# Patient Record
Sex: MALE | URBAN - METROPOLITAN AREA
[De-identification: names, ages, dates, MRNs, and addresses within clinical notes are randomized per-mention and may not be internally consistent; named-entity substitution may affect disease eponyms.]

---

## 2021-12-15 ENCOUNTER — OFFICE (OUTPATIENT)
Dept: URBAN - METROPOLITAN AREA PATHOLOGY 4 | Facility: PATHOLOGY | Age: 66
End: 2021-12-15
Payer: OTHER GOVERNMENT

## 2021-12-15 ENCOUNTER — AMBULATORY SURGICAL CENTER (OUTPATIENT)
Dept: URBAN - METROPOLITAN AREA SURGERY 10 | Facility: SURGERY | Age: 66
End: 2021-12-15
Payer: OTHER GOVERNMENT

## 2021-12-15 DIAGNOSIS — K22.4 DYSKINESIA OF ESOPHAGUS: ICD-10-CM

## 2021-12-15 DIAGNOSIS — K29.50 UNSPECIFIED CHRONIC GASTRITIS WITHOUT BLEEDING: ICD-10-CM

## 2021-12-15 DIAGNOSIS — K44.9 DIAPHRAGMATIC HERNIA WITHOUT OBSTRUCTION OR GANGRENE: ICD-10-CM

## 2021-12-15 DIAGNOSIS — K21.9 GASTRO-ESOPHAGEAL REFLUX DISEASE WITHOUT ESOPHAGITIS: ICD-10-CM

## 2021-12-15 DIAGNOSIS — K22.89 OTHER SPECIFIED DISEASE OF ESOPHAGUS: ICD-10-CM

## 2021-12-15 DIAGNOSIS — K31.89 OTHER DISEASES OF STOMACH AND DUODENUM: ICD-10-CM

## 2021-12-15 PROCEDURE — 88305 TISSUE EXAM BY PATHOLOGIST: CPT | Performed by: INTERNAL MEDICINE

## 2021-12-15 PROCEDURE — 43239 EGD BIOPSY SINGLE/MULTIPLE: CPT | Performed by: INTERNAL MEDICINE

## 2021-12-16 PROBLEM — K21.9 ACID REFLUX: Status: ACTIVE | Noted: 2021-12-16

## 2022-12-21 ENCOUNTER — TELEPHONE (OUTPATIENT)
Dept: CARDIOLOGY | Facility: CLINIC | Age: 67
End: 2022-12-21

## 2022-12-21 NOTE — TELEPHONE ENCOUNTER
Dr Dial spoke with Dr. Lutz via phone regarding TAVR workup for this pt.     I spoke with pt this morning. He states that he was worked up at  for his valve last year but things were not as bad as they looked on the original echo. I have obtained the physical copy of records from  and have requested the images from  and the echo images from Dr. Dial's office. PT currently has COVID so he can do any testing/procedures right now anyway. His father is actively passing in [place] so he is trying to get there as well to see him. Pt denies any other symptoms at this time- no shortness of breath, no fatigue- just covid symptoms. Will f/u with pt after Dr. Lutz has a chance to review all the records     Notified ANN MARIE Hernandez as well

## 2022-12-29 ENCOUNTER — APPOINTMENT (OUTPATIENT)
Dept: PREADMISSION TESTING | Facility: HOSPITAL | Age: 67
End: 2022-12-29

## 2023-01-04 ENCOUNTER — HOSPITAL ENCOUNTER (OUTPATIENT)
Dept: CARDIOLOGY | Facility: HOSPITAL | Age: 68
Discharge: HOME | End: 2023-01-04
Payer: MEDICARE

## 2023-01-04 DIAGNOSIS — Z00.6 ENCOUNTER FOR EXAMINATION FOR NORMAL COMPARISON OR CONTROL IN CLINICAL RESEARCH PROGRAM: ICD-10-CM

## 2023-01-16 ENCOUNTER — TELEPHONE (OUTPATIENT)
Dept: CARDIOLOGY | Facility: CLINIC | Age: 68
End: 2023-01-16
Payer: MEDICARE

## 2023-01-16 DIAGNOSIS — I35.0 AORTIC STENOSIS, SEVERE: Primary | ICD-10-CM

## 2023-01-16 NOTE — TELEPHONE ENCOUNTER
Spoke with pt- Dr. Lutz reviewed echo images- he will need FLOR- ok to do consult and treat FLOR if pt wishes to proceed. He has a planned trip to [place] next month and wants to schedule around that trip. Pt still denies symptoms to me.     Per Dr. Lutz, will need to obtain [facility] images from 2021 done at  - those were requested via fax 1/16/2023.     Pt agreeable to consult and treat FLOR

## 2023-01-19 ENCOUNTER — HOSPITAL ENCOUNTER (OUTPATIENT)
Dept: CARDIOLOGY | Facility: HOSPITAL | Age: 68
Discharge: HOME | End: 2023-01-19

## 2023-01-19 DIAGNOSIS — Z00.6 EXAMINATION FOR NORMAL COMPARISON OR CONTROL IN CLINICAL RESEARCH: ICD-10-CM

## 2023-01-28 PROBLEM — I35.0 AORTIC STENOSIS, SEVERE: Status: ACTIVE | Noted: 2023-01-28

## 2023-01-28 PROBLEM — I25.10 CORONARY ARTERY DISEASE INVOLVING NATIVE CORONARY ARTERY OF NATIVE HEART: Status: ACTIVE | Noted: 2023-01-28

## 2023-02-01 ENCOUNTER — OFFICE VISIT (OUTPATIENT)
Dept: CARDIOLOGY | Facility: CLINIC | Age: 68
End: 2023-02-01
Payer: MEDICARE

## 2023-02-01 VITALS
DIASTOLIC BLOOD PRESSURE: 64 MMHG | OXYGEN SATURATION: 98 % | SYSTOLIC BLOOD PRESSURE: 130 MMHG | HEART RATE: 102 BPM | WEIGHT: 181 LBS | BODY MASS INDEX: 26.81 KG/M2 | HEIGHT: 69 IN | RESPIRATION RATE: 20 BRPM

## 2023-02-01 DIAGNOSIS — I49.3 PVC (PREMATURE VENTRICULAR CONTRACTION): ICD-10-CM

## 2023-02-01 DIAGNOSIS — I25.10 CORONARY ARTERY DISEASE INVOLVING NATIVE CORONARY ARTERY OF NATIVE HEART WITHOUT ANGINA PECTORIS: ICD-10-CM

## 2023-02-01 DIAGNOSIS — I35.0 AORTIC STENOSIS, SEVERE: Primary | ICD-10-CM

## 2023-02-01 DIAGNOSIS — E78.2 MIXED HYPERLIPIDEMIA: ICD-10-CM

## 2023-02-01 PROCEDURE — 93000 ELECTROCARDIOGRAM COMPLETE: CPT | Performed by: NURSE PRACTITIONER

## 2023-02-01 PROCEDURE — 99203 OFFICE O/P NEW LOW 30 MIN: CPT | Performed by: INTERNAL MEDICINE

## 2023-02-01 RX ORDER — LORATADINE 10 MG/1
10 TABLET ORAL DAILY PRN
COMMUNITY

## 2023-02-01 RX ORDER — TRIAMCINOLONE ACETONIDE 1 MG/G
1 CREAM TOPICAL 2 TIMES DAILY PRN
COMMUNITY

## 2023-02-01 RX ORDER — ATORVASTATIN CALCIUM 20 MG/1
20 TABLET, FILM COATED ORAL DAILY
COMMUNITY

## 2023-02-01 RX ORDER — LOSARTAN POTASSIUM 50 MG/1
50 TABLET ORAL DAILY
COMMUNITY

## 2023-02-01 RX ORDER — VIT C/B6/B5/MAGNESIUM/HERB 173 50-5-6-5MG
1000 CAPSULE ORAL DAILY
COMMUNITY

## 2023-02-02 DIAGNOSIS — I35.0 AORTIC STENOSIS, SEVERE: Primary | ICD-10-CM

## 2023-02-02 DIAGNOSIS — R09.89 OTHER SPECIFIED SYMPTOMS AND SIGNS INVOLVING THE CIRCULATORY AND RESPIRATORY SYSTEMS: ICD-10-CM

## 2023-02-02 NOTE — PROGRESS NOTES
"Referral received from Dr. Lutz due to severe AS. Contacted patient to discuss valve replacement options, pre-procedural testing requirements and need for CTS consult. He feels well informed after his discussion with Dr. Lutz. Order placed for FLOR. He originally planned to be in [place] from -, but his father may be transitioning to Hospice care in [place] and he is unsure of his travel plans. We will schedule FLOR, CTA and carotid duplex when he returns. Up to date on dental appointments, most recent 2023. He does not have a CTS MD preference. Educational materials reviewed and mailed to patient.    Will follow up week of     TAVR Pre-Op Checklist    Patient Name: Jesus Alberto Sue   67 y.o. [id number]  Residence: [place], [place]    Referral Date: 23 : 1955   Ht:69\" Wt: 181lb     Referring Cardiologist: Dr. Bruce/Dr. Lutz  BMI: 26.7    Initial TTE date:OUTSIDE NON-INVASIVE CARDIOLOGY STUDY (2023 10:14)    MASSIEL: 0.8cm2 AV mean: 52 AV Vmax: 72 LVEF: 50-55%    CT Surgery Consult: P     STS Score: P     Allergy (Contrast):Patient has no known allergies.   Pre-op meds (contrast): N     Anticoagulated: No Last dose:NA Heparin or Lovenox Bridge: NA    CTA Date: P     CTA 3D: P    Left Coronary Ht: P  Right Coronary Ht: P  Annulus Area: P    CTA Important findings: P    Cardiac cath:OUTSIDE INVASIVE CARDIOLOGY STUDY (2023 14:53)    FLOR: P        Physician: Dr. Lutz   FLOR annulus: P  Projected TAVR Prosthesis Size:     Carotid duplex: P      Dobutamine GXT:P      EKG rhythm: P   PPM/ Last download :NA    PFT (FEV1):P    Important meds: None    PAT lab review:  BNP:P BUN/Creatinine: P H&H:P  PLTS:P    P2Y12:P HGA1C: P CXR:P      TAVR Procedure Date: P          "

## 2023-02-13 ENCOUNTER — PREP FOR SURGERY (OUTPATIENT)
Dept: OTHER | Facility: HOSPITAL | Age: 68
End: 2023-02-13
Payer: MEDICARE

## 2023-02-23 ENCOUNTER — HOSPITAL ENCOUNTER (OUTPATIENT)
Dept: CARDIOLOGY | Facility: HOSPITAL | Age: 68
Discharge: HOME | End: 2023-02-23
Payer: MEDICARE

## 2023-02-23 VITALS — HEIGHT: 69 IN | BODY MASS INDEX: 26.81 KG/M2 | WEIGHT: 181 LBS

## 2023-02-23 VITALS
RESPIRATION RATE: 16 BRPM | SYSTOLIC BLOOD PRESSURE: 103 MMHG | HEART RATE: 74 BPM | OXYGEN SATURATION: 93 % | DIASTOLIC BLOOD PRESSURE: 60 MMHG

## 2023-02-23 DIAGNOSIS — R09.89 OTHER SPECIFIED SYMPTOMS AND SIGNS INVOLVING THE CIRCULATORY AND RESPIRATORY SYSTEMS: ICD-10-CM

## 2023-02-23 DIAGNOSIS — I35.0 AORTIC STENOSIS, SEVERE: ICD-10-CM

## 2023-02-23 LAB
ASCENDING AORTA: 4 CM
BH CV ECHO MEAS - LV MAX PG: 1.63 MMHG
BH CV ECHO MEAS - LV MEAN PG: 1 MMHG
BH CV ECHO MEAS - LV V1 MAX: 63.9 CM/SEC
BH CV ECHO MEAS - LV V1 VTI: 13.3 CM
BH CV ECHO MEAS - LVOT AREA: 7.1 CM2
BH CV ECHO MEAS - LVOT DIAM: 3 CM
BH CV ECHO MEAS - SV(LVOT): 94 ML
BH CV VAS BP LEFT ARM: NORMAL MMHG
BH CV XLRA MEAS LEFT DIST CCA EDV: 16.5 CM/SEC
BH CV XLRA MEAS LEFT DIST CCA PSV: 108 CM/SEC
BH CV XLRA MEAS LEFT DIST ICA EDV: 23.2 CM/SEC
BH CV XLRA MEAS LEFT DIST ICA PSV: 75.6 CM/SEC
BH CV XLRA MEAS LEFT ICA/CCA RATIO: 0.8
BH CV XLRA MEAS LEFT MID CCA EDV: 18.4 CM/SEC
BH CV XLRA MEAS LEFT MID CCA PSV: 142 CM/SEC
BH CV XLRA MEAS LEFT MID ICA EDV: 30.6 CM/SEC
BH CV XLRA MEAS LEFT MID ICA PSV: 114 CM/SEC
BH CV XLRA MEAS LEFT PROX CCA EDV: 8.5 CM/SEC
BH CV XLRA MEAS LEFT PROX CCA PSV: 110 CM/SEC
BH CV XLRA MEAS LEFT PROX ECA EDV: 23.8 CM/SEC
BH CV XLRA MEAS LEFT PROX ECA PSV: 91.8 CM/SEC
BH CV XLRA MEAS LEFT PROX ICA EDV: 27.4 CM/SEC
BH CV XLRA MEAS LEFT PROX ICA PSV: 76.8 CM/SEC
BH CV XLRA MEAS LEFT PROX SCLA PSV: 142 CM/SEC
BH CV XLRA MEAS LEFT VERTEBRAL A EDV: 16.1 CM/SEC
BH CV XLRA MEAS LEFT VERTEBRAL A PSV: 62.3 CM/SEC
BH CV XLRA MEAS RIGHT DIST CCA EDV: 13.9 CM/SEC
BH CV XLRA MEAS RIGHT DIST CCA PSV: 95.3 CM/SEC
BH CV XLRA MEAS RIGHT DIST ICA EDV: 21.1 CM/SEC
BH CV XLRA MEAS RIGHT DIST ICA PSV: 79.1 CM/SEC
BH CV XLRA MEAS RIGHT ICA/CCA RATIO: 0.76
BH CV XLRA MEAS RIGHT MID CCA EDV: 13.9 CM/SEC
BH CV XLRA MEAS RIGHT MID CCA PSV: 106 CM/SEC
BH CV XLRA MEAS RIGHT MID ICA EDV: 22.5 CM/SEC
BH CV XLRA MEAS RIGHT MID ICA PSV: 81.2 CM/SEC
BH CV XLRA MEAS RIGHT PROX CCA EDV: 14.7 CM/SEC
BH CV XLRA MEAS RIGHT PROX CCA PSV: 101 CM/SEC
BH CV XLRA MEAS RIGHT PROX ECA EDV: 13.2 CM/SEC
BH CV XLRA MEAS RIGHT PROX ECA PSV: 49.9 CM/SEC
BH CV XLRA MEAS RIGHT PROX ICA EDV: 19.8 CM/SEC
BH CV XLRA MEAS RIGHT PROX ICA PSV: 76.3 CM/SEC
BH CV XLRA MEAS RIGHT PROX SCLA PSV: 110 CM/SEC
BH CV XLRA MEAS RIGHT VERTEBRAL A EDV: 13.2 CM/SEC
BH CV XLRA MEAS RIGHT VERTEBRAL A PSV: 62.6 CM/SEC
LEFT ARM BP: NORMAL MMHG
LV EF 2D ECHO EST: 45 %
MAXIMAL PREDICTED HEART RATE: 153 BPM
MAXIMAL PREDICTED HEART RATE: 153 BPM
STRESS TARGET HR: 130 BPM
STRESS TARGET HR: 130 BPM

## 2023-02-23 PROCEDURE — 93312 ECHO TRANSESOPHAGEAL: CPT

## 2023-02-23 PROCEDURE — 93320 DOPPLER ECHO COMPLETE: CPT | Performed by: INTERNAL MEDICINE

## 2023-02-23 PROCEDURE — 93325 DOPPLER ECHO COLOR FLOW MAPG: CPT | Performed by: INTERNAL MEDICINE

## 2023-02-23 PROCEDURE — 25010000002 FENTANYL CITRATE (PF) 50 MCG/ML SOLUTION: Performed by: INTERNAL MEDICINE

## 2023-02-23 PROCEDURE — 93880 EXTRACRANIAL BILAT STUDY: CPT | Performed by: INTERNAL MEDICINE

## 2023-02-23 PROCEDURE — 93320 DOPPLER ECHO COMPLETE: CPT

## 2023-02-23 PROCEDURE — 93325 DOPPLER ECHO COLOR FLOW MAPG: CPT

## 2023-02-23 PROCEDURE — 25010000002 MIDAZOLAM PER 1 MG: Performed by: INTERNAL MEDICINE

## 2023-02-23 PROCEDURE — 93312 ECHO TRANSESOPHAGEAL: CPT | Performed by: INTERNAL MEDICINE

## 2023-02-23 PROCEDURE — 93880 EXTRACRANIAL BILAT STUDY: CPT

## 2023-02-23 PROCEDURE — 99152 MOD SED SAME PHYS/QHP 5/>YRS: CPT | Performed by: INTERNAL MEDICINE

## 2023-02-23 RX ORDER — FENTANYL CITRATE 50 UG/ML
INJECTION, SOLUTION INTRAMUSCULAR; INTRAVENOUS
Status: COMPLETED | OUTPATIENT
Start: 2023-02-23 | End: 2023-02-23

## 2023-02-23 RX ORDER — MIDAZOLAM HYDROCHLORIDE 1 MG/ML
INJECTION INTRAMUSCULAR; INTRAVENOUS
Status: COMPLETED | OUTPATIENT
Start: 2023-02-23 | End: 2023-02-23

## 2023-02-23 RX ADMIN — MIDAZOLAM HYDROCHLORIDE 2 MG: 1 INJECTION, SOLUTION INTRAMUSCULAR; INTRAVENOUS at 14:45

## 2023-02-23 RX ADMIN — FENTANYL CITRATE 50 MCG: 50 INJECTION, SOLUTION INTRAMUSCULAR; INTRAVENOUS at 14:50

## 2023-02-23 RX ADMIN — FENTANYL CITRATE 50 MCG: 50 INJECTION, SOLUTION INTRAMUSCULAR; INTRAVENOUS at 14:45

## 2023-02-23 RX ADMIN — MIDAZOLAM HYDROCHLORIDE 2 MG: 1 INJECTION, SOLUTION INTRAMUSCULAR; INTRAVENOUS at 14:50

## 2023-02-23 NOTE — INTERVAL H&P NOTE
Preprocedural Update  H&P updated. The patient was examined and the following changes are noted:  The patient has noted a cough for around a week. COVID19 testing negative two days ago.     Problem List:     1. Nonobstructive coronary artery disease  a. Cardiac catheterization, 6/17/2021: Nonobstructive CAD.  Mild aortic stenosis with mean gradient 18 mmHg  2. Nonrheumatic aortic stenosis  a. Mild nonobstructive aortic stenosis with mean gradient 18 mmHg noted on cath 6/2021  b. Echocardiogram, 12/19/2022; EF 50-55%. Mild concentric LVH. LA is mildly dilated. RV mildly enlarged. Severe aortic stenosis with peak/mean pressure gradient of 72.85 mmHg/52.10 mmHg, the AV area by continuity is 0.8 cm2. Mild AR. Mild MR. Diastolic function is abnormal with a septal E = 0.07 m/s. Mild TR. Mild PH. RVSP 42.63 mmHg. 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 There is an aneurysm of the ascending aorta measuring up to 3.9 cm.   3. PVC's  a. Noted on EKG 2/1/2023  4. KYLEIGH, intolerant to CPAP  5. GERD    • Patient denies previous complication from sedation. NKDA    Tele: Sinus rhythm with frequent PVCs    Vitals and Labs  There were no vitals filed for this visit.  Lab Results   Component Value Date    BUN 16 06/16/2021    CREATININE 0.96 06/16/2021    BCR 17 06/16/2021    K 4.5 06/16/2021    CO2 24 06/16/2021    CALCIUM 8.9 06/16/2021     Lab Results   Component Value Date    WBC 5.94 06/16/2021    HGB 12.0 (L) 06/16/2021    HCT 35.4 (L) 06/16/2021    MCV 92 06/16/2021     06/16/2021     No results found for: CHOL, CHLPL, TRIG, HDL, LDL, LDLDIRECT  No results found for: HGBA1C    The nature transesophageal echo was discussed with the patient as well as the indication, benefits, risks, and alternatives of the procedure.  Patient expresses understanding wish to proceed.    LILIA Bearden MD, FACC

## 2023-03-01 ENCOUNTER — HOSPITAL ENCOUNTER (OUTPATIENT)
Dept: CT IMAGING | Facility: HOSPITAL | Age: 68
Discharge: HOME | End: 2023-03-01
Admitting: INTERNAL MEDICINE
Payer: MEDICARE

## 2023-03-01 VITALS
SYSTOLIC BLOOD PRESSURE: 115 MMHG | WEIGHT: 181 LBS | DIASTOLIC BLOOD PRESSURE: 68 MMHG | BODY MASS INDEX: 26.81 KG/M2 | TEMPERATURE: 96.9 F | RESPIRATION RATE: 16 BRPM | OXYGEN SATURATION: 98 % | HEIGHT: 69 IN | HEART RATE: 85 BPM

## 2023-03-01 DIAGNOSIS — I35.0 AORTIC STENOSIS, SEVERE: ICD-10-CM

## 2023-03-01 PROCEDURE — 25510000001 IOPAMIDOL PER 1 ML: Performed by: INTERNAL MEDICINE

## 2023-03-01 PROCEDURE — A9270 NON-COVERED ITEM OR SERVICE: HCPCS | Performed by: RADIOLOGY

## 2023-03-01 PROCEDURE — 63710000001 METOPROLOL TARTRATE 50 MG TABLET: Performed by: RADIOLOGY

## 2023-03-01 PROCEDURE — 71275 CT ANGIOGRAPHY CHEST: CPT

## 2023-03-01 PROCEDURE — 74174 CTA ABD&PLVS W/CONTRAST: CPT

## 2023-03-01 RX ORDER — METOPROLOL TARTRATE 50 MG/1
50 TABLET, FILM COATED ORAL ONCE AS NEEDED
Status: COMPLETED | OUTPATIENT
Start: 2023-03-01 | End: 2023-03-01

## 2023-03-01 RX ORDER — SODIUM CHLORIDE 0.9 % (FLUSH) 0.9 %
10 SYRINGE (ML) INJECTION AS NEEDED
Status: DISCONTINUED | OUTPATIENT
Start: 2023-03-01 | End: 2023-03-02 | Stop reason: HOSPADM

## 2023-03-01 RX ORDER — SODIUM CHLORIDE 0.9 % (FLUSH) 0.9 %
3 SYRINGE (ML) INJECTION EVERY 12 HOURS SCHEDULED
Status: DISCONTINUED | OUTPATIENT
Start: 2023-03-01 | End: 2023-03-02 | Stop reason: HOSPADM

## 2023-03-01 RX ORDER — LIDOCAINE HYDROCHLORIDE 10 MG/ML
5 INJECTION, SOLUTION EPIDURAL; INFILTRATION; INTRACAUDAL; PERINEURAL AS NEEDED
Status: DISCONTINUED | OUTPATIENT
Start: 2023-03-01 | End: 2023-03-02 | Stop reason: HOSPADM

## 2023-03-01 RX ORDER — METOPROLOL TARTRATE 50 MG/1
100 TABLET, FILM COATED ORAL ONCE AS NEEDED
Status: COMPLETED | OUTPATIENT
Start: 2023-03-01 | End: 2023-03-01

## 2023-03-01 RX ADMIN — METOPROLOL TARTRATE 50 MG: 50 TABLET, FILM COATED ORAL at 12:36

## 2023-03-01 RX ADMIN — IOPAMIDOL 100 ML: 755 INJECTION, SOLUTION INTRAVENOUS at 13:56

## 2023-03-06 ENCOUNTER — DOCUMENTATION (OUTPATIENT)
Dept: CARDIOLOGY | Facility: CLINIC | Age: 68
End: 2023-03-06
Payer: MEDICARE

## 2023-03-06 ENCOUNTER — OFFICE VISIT (OUTPATIENT)
Dept: CARDIAC SURGERY | Facility: CLINIC | Age: 68
End: 2023-03-06
Payer: MEDICARE

## 2023-03-06 VITALS
HEIGHT: 69 IN | BODY MASS INDEX: 26.07 KG/M2 | OXYGEN SATURATION: 98 % | HEART RATE: 76 BPM | WEIGHT: 176 LBS | TEMPERATURE: 98.6 F | DIASTOLIC BLOOD PRESSURE: 72 MMHG | SYSTOLIC BLOOD PRESSURE: 128 MMHG

## 2023-03-06 DIAGNOSIS — I35.9 AORTIC VALVE DISORDER: Primary | ICD-10-CM

## 2023-03-06 PROCEDURE — 99205 OFFICE O/P NEW HI 60 MIN: CPT | Performed by: THORACIC SURGERY (CARDIOTHORACIC VASCULAR SURGERY)

## 2023-03-06 NOTE — H&P (VIEW-ONLY)
03/06/2023  Patient Information  Jesus Alberto Sue                                                                                          [address] [facility] [address]   1955  'PCP/Referring Physician'  Sameer Desai MD  [phone]  Fabi Stinson APRN  [phone]  Chief Complaint   Patient presents with   • Consult     New patient  per Fabi JESUS for TAVR consult. 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 Denies any SOB or fatigue.        History of Present Illness:  The patient is a 67-year-old male who was referred for transcatheter aortic valve evaluation.  He has already undergone much of the work-up process.  He has had a known heart murmur for many years and had undergone cardiac catheterization at the [facility] fairly recently with no evidence of obstructive coronary disease.  He has also undergone his transesophageal echo and precordial echo and carotid duplex scan.  I reviewed the data from those and his peak gradient across his valve is 72 mmHg with a valve area of 0.8 cm².  I have also reviewed his CT scan and it appears that his access vessels are satisfactory for catheter delivery.  He is accompanied today by his wife and I have discussed the procedure with him and answered his questions.      Patient Active Problem List   Diagnosis   • Nonobstructive coronary artery disease   • Aortic stenosis, severe   • Mixed hyperlipidemia   • PVC (premature ventricular contraction)   • Aortic valve disorder     Past Medical History:   Diagnosis Date   • Acid reflux    • Heart murmur    • High cholesterol    • History of stomach ulcers    • Hypertension      Past Surgical History:   Procedure Laterality Date   • KNEE SURGERY Left 1977   • LASIK Bilateral 2022       Current Outpatient Medications:   •  ascorbic acid (VITAMIN C) 1000 MG tablet, Take 1 tablet by mouth Daily., Disp: , Rfl:   •  atorvastatin (LIPITOR) 20 MG tablet,  Take 1 tablet by mouth Every Night., Disp: , Rfl:   •  Calcium-Phosphorus-Vitamin D (CITRACAL +D3 PO), Take 1 tablet by mouth Daily., Disp: , Rfl:   •  Flaxseed, Linseed, (Flaxseed Oil) 1400 MG capsule, Take 2 tablets by mouth Daily., Disp: , Rfl:   •  loratadine (CLARITIN) 10 MG tablet, Take 1 tablet by mouth Daily As Needed for Allergies., Disp: , Rfl:   •  losartan (COZAAR) 50 MG tablet, Take 1 tablet by mouth Daily., Disp: , Rfl:   •  triamcinolone (KENALOG) 0.1 % cream, Apply 1 application topically to the appropriate area as directed 2 (Two) Times a Day As Needed., Disp: , Rfl:   •  Turmeric 500 MG capsule, Take 3 capsules by mouth Daily., Disp: , Rfl:   No Known Allergies  Social History     Socioeconomic History   • Marital status:    • Number of children: 0   Tobacco Use   • Smoking status: Never   • Smokeless tobacco: Former     Types: Snuff     Quit date: 1998   Vaping Use   • Vaping Use: Never used   Substance and Sexual Activity   • Alcohol use: Yes     Alcohol/week: 2.0 standard drinks     Types: 2 Shots of liquor per week     Comment: 7 days a week   • Drug use: Never   • Sexual activity: Defer     Family History   Problem Relation Age of Onset   • Cancer Mother    • Parkinsonism Father    • Heart disease Father    • Heart attack Brother    • No Known Problems Brother    • Obesity Brother      Review of Systems   Constitutional: Negative for chills, decreased appetite, fever, malaise/fatigue, weight gain and weight loss.   HENT: Positive for congestion (allergies ). Negative for hearing loss.    Cardiovascular: Negative for chest pain, claudication, cyanosis, dyspnea on exertion, irregular heartbeat, leg swelling, near-syncope, orthopnea, palpitations, paroxysmal nocturnal dyspnea and syncope.   Respiratory: Positive for cough.    Endocrine: Negative for polydipsia, polyphagia and polyuria.   Hematologic/Lymphatic: Negative for adenopathy. Does not bruise/bleed easily.   Musculoskeletal:  "Negative for arthritis, falls, gout, joint pain, joint swelling, muscle weakness and myalgias.   Gastrointestinal: Positive for nausea. Negative for abdominal pain, anorexia, dysphagia, heartburn and vomiting.   Genitourinary: Negative for dysuria and hematuria.   Neurological: Negative for dizziness, focal weakness, headaches, loss of balance, numbness, seizures, vertigo and weakness.   Psychiatric/Behavioral: Negative for altered mental status, depression, substance abuse and suicidal ideas. Hallucinations: pt states all his life he not slept well. The patient has insomnia. The patient is not nervous/anxious.    Allergic/Immunologic: Positive for environmental allergies. Negative for HIV exposure and persistent infections.     Vitals:    03/06/23 0714   BP: 128/72   Pulse: 76   Temp: 98.6 °F (37 °C)   SpO2: 98%   Weight: 79.8 kg (176 lb)   Height: 175.3 cm (69\")      Physical Exam   CONSTITUTIONAL: Alert and conversant, Well dressed, Well nourished, No acute distress  EYES: Sclera clean, Anicteric, Pupils equal  ENT: No nasal deviation, Trachea midline  NECK: No neck masses, Supple  LUNGS: No wheezing, Cough, non-congested  HEART: No rubs, murmur to the right of the sternal border  GASTROINTESTINAL: Soft, non-distended, No masses, Non tender  to palpation, normal bowel sounds  NEURO: No motor deficits, No sensory deficits, Cranial Nerves 2 through 12 grossly intact  PSYCHIATRIC: Oriented to person, place and time, No memory deficits, Mood appropriate  VASCULAR: No carotid bruits, Femoral pulses palpable and symmetric  MUSKULOSKELETAL: No extremity trauma or extremity asymmetry    The ROS, past medical history, surgical history, family history, social history and vitals were reviewed by myself and corrected as needed.      Labs/Imaging:  I reviewed the CT angiogram images for iliofemoral access for TAVR as well as the duplex scan data and the cardiac catheterization images.  Smoking cessation was not discussed as " he discontinued smoking over 20 years ago and denies advanced directive.    Assessment/Plan:   The patient is a 67-year-old male who has significant aortic valvular stenosis with a peak gradient of 72 mmHg with an aortic valve area of 0.8 cm².  Catheterization demonstrates no significant coronary disease and on CT angiogram it appears his access vessels are adequate for valve placement.  I discussed the procedure and that there is always a risk of stroke, bleeding, infection, death and possible need for a permanent pacemaker.  We discussed the option of open surgical repair versus the option of transcatheter valve repair in detail and he would very much like to proceed with transcatheter valve replacement rather than open surgical procedure.  This also is the opinion of Dr. Lutz, his cardiologist. The patient is tentatively scheduled on the 23rd of this month, however, that could change.  I have indicated the typical hospital stay is 1 night, however, that could always change based upon other factors.  He is very agreeable to proceed and would like to proceed as soon as possible.    Patient Active Problem List   Diagnosis   • Nonobstructive coronary artery disease   • Aortic stenosis, severe   • Mixed hyperlipidemia   • PVC (premature ventricular contraction)   • Aortic valve disorder       CC: MD Fabi Camilo APRN Regina Fugate editing for Lauri Kc MD      I, Lauri Kc MD, have read and agree with the editing done by Anushka Vicente, .

## 2023-03-06 NOTE — PROGRESS NOTES
03/06/2023  Patient Information  Jesus Alberto Sue                                                                                          [address] [facility] [address]   1955  'PCP/Referring Physician'  Sameer Desai MD  [phone]  Fabi Stinson APRN  [phone]  Chief Complaint   Patient presents with   • Consult     New patient  per Fabi JESUS for TAVR consult. 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 Denies any SOB or fatigue.        History of Present Illness:  The patient is a 67-year-old male who was referred for transcatheter aortic valve evaluation.  He has already undergone much of the work-up process.  He has had a known heart murmur for many years and had undergone cardiac catheterization at the [facility] fairly recently with no evidence of obstructive coronary disease.  He has also undergone his transesophageal echo and precordial echo and carotid duplex scan.  I reviewed the data from those and his peak gradient across his valve is 72 mmHg with a valve area of 0.8 cm².  I have also reviewed his CT scan and it appears that his access vessels are satisfactory for catheter delivery.  He is accompanied today by his wife and I have discussed the procedure with him and answered his questions.      Patient Active Problem List   Diagnosis   • Nonobstructive coronary artery disease   • Aortic stenosis, severe   • Mixed hyperlipidemia   • PVC (premature ventricular contraction)   • Aortic valve disorder     Past Medical History:   Diagnosis Date   • Acid reflux    • Heart murmur    • High cholesterol    • History of stomach ulcers    • Hypertension      Past Surgical History:   Procedure Laterality Date   • KNEE SURGERY Left 1977   • LASIK Bilateral 2022       Current Outpatient Medications:   •  ascorbic acid (VITAMIN C) 1000 MG tablet, Take 1 tablet by mouth Daily., Disp: , Rfl:   •  atorvastatin (LIPITOR) 20 MG tablet,  Take 1 tablet by mouth Every Night., Disp: , Rfl:   •  Calcium-Phosphorus-Vitamin D (CITRACAL +D3 PO), Take 1 tablet by mouth Daily., Disp: , Rfl:   •  Flaxseed, Linseed, (Flaxseed Oil) 1400 MG capsule, Take 2 tablets by mouth Daily., Disp: , Rfl:   •  loratadine (CLARITIN) 10 MG tablet, Take 1 tablet by mouth Daily As Needed for Allergies., Disp: , Rfl:   •  losartan (COZAAR) 50 MG tablet, Take 1 tablet by mouth Daily., Disp: , Rfl:   •  triamcinolone (KENALOG) 0.1 % cream, Apply 1 application topically to the appropriate area as directed 2 (Two) Times a Day As Needed., Disp: , Rfl:   •  Turmeric 500 MG capsule, Take 3 capsules by mouth Daily., Disp: , Rfl:   No Known Allergies  Social History     Socioeconomic History   • Marital status:    • Number of children: 0   Tobacco Use   • Smoking status: Never   • Smokeless tobacco: Former     Types: Snuff     Quit date: 1998   Vaping Use   • Vaping Use: Never used   Substance and Sexual Activity   • Alcohol use: Yes     Alcohol/week: 2.0 standard drinks     Types: 2 Shots of liquor per week     Comment: 7 days a week   • Drug use: Never   • Sexual activity: Defer     Family History   Problem Relation Age of Onset   • Cancer Mother    • Parkinsonism Father    • Heart disease Father    • Heart attack Brother    • No Known Problems Brother    • Obesity Brother      Review of Systems   Constitutional: Negative for chills, decreased appetite, fever, malaise/fatigue, weight gain and weight loss.   HENT: Positive for congestion (allergies ). Negative for hearing loss.    Cardiovascular: Negative for chest pain, claudication, cyanosis, dyspnea on exertion, irregular heartbeat, leg swelling, near-syncope, orthopnea, palpitations, paroxysmal nocturnal dyspnea and syncope.   Respiratory: Positive for cough.    Endocrine: Negative for polydipsia, polyphagia and polyuria.   Hematologic/Lymphatic: Negative for adenopathy. Does not bruise/bleed easily.   Musculoskeletal:  "Negative for arthritis, falls, gout, joint pain, joint swelling, muscle weakness and myalgias.   Gastrointestinal: Positive for nausea. Negative for abdominal pain, anorexia, dysphagia, heartburn and vomiting.   Genitourinary: Negative for dysuria and hematuria.   Neurological: Negative for dizziness, focal weakness, headaches, loss of balance, numbness, seizures, vertigo and weakness.   Psychiatric/Behavioral: Negative for altered mental status, depression, substance abuse and suicidal ideas. Hallucinations: pt states all his life he not slept well. The patient has insomnia. The patient is not nervous/anxious.    Allergic/Immunologic: Positive for environmental allergies. Negative for HIV exposure and persistent infections.     Vitals:    03/06/23 0714   BP: 128/72   Pulse: 76   Temp: 98.6 °F (37 °C)   SpO2: 98%   Weight: 79.8 kg (176 lb)   Height: 175.3 cm (69\")      Physical Exam   CONSTITUTIONAL: Alert and conversant, Well dressed, Well nourished, No acute distress  EYES: Sclera clean, Anicteric, Pupils equal  ENT: No nasal deviation, Trachea midline  NECK: No neck masses, Supple  LUNGS: No wheezing, Cough, non-congested  HEART: No rubs, murmur to the right of the sternal border  GASTROINTESTINAL: Soft, non-distended, No masses, Non tender  to palpation, normal bowel sounds  NEURO: No motor deficits, No sensory deficits, Cranial Nerves 2 through 12 grossly intact  PSYCHIATRIC: Oriented to person, place and time, No memory deficits, Mood appropriate  VASCULAR: No carotid bruits, Femoral pulses palpable and symmetric  MUSKULOSKELETAL: No extremity trauma or extremity asymmetry    The ROS, past medical history, surgical history, family history, social history and vitals were reviewed by myself and corrected as needed.      Labs/Imaging:  I reviewed the CT angiogram images for iliofemoral access for TAVR as well as the duplex scan data and the cardiac catheterization images.  Smoking cessation was not discussed as " he discontinued smoking over 20 years ago and denies advanced directive.    Assessment/Plan:   The patient is a 67-year-old male who has significant aortic valvular stenosis with a peak gradient of 72 mmHg with an aortic valve area of 0.8 cm².  Catheterization demonstrates no significant coronary disease and on CT angiogram it appears his access vessels are adequate for valve placement.  I discussed the procedure and that there is always a risk of stroke, bleeding, infection, death and possible need for a permanent pacemaker.  We discussed the option of open surgical repair versus the option of transcatheter valve repair in detail and he would very much like to proceed with transcatheter valve replacement rather than open surgical procedure.  This also is the opinion of Dr. Lutz, his cardiologist. The patient is tentatively scheduled on the 23rd of this month, however, that could change.  I have indicated the typical hospital stay is 1 night, however, that could always change based upon other factors.  He is very agreeable to proceed and would like to proceed as soon as possible.    Patient Active Problem List   Diagnosis   • Nonobstructive coronary artery disease   • Aortic stenosis, severe   • Mixed hyperlipidemia   • PVC (premature ventricular contraction)   • Aortic valve disorder       CC: MD Fabi Camilo APRN Regina Fugate editing for Lauri Kc MD      I, Lauri Kc MD, have read and agree with the editing done by Anushka Vicente, .

## 2023-03-09 ENCOUNTER — TELEPHONE (OUTPATIENT)
Dept: CARDIOLOGY | Facility: CLINIC | Age: 68
End: 2023-03-09
Payer: MEDICARE

## 2023-03-09 NOTE — TELEPHONE ENCOUNTER
Patient called and LVM regarding his TAVR date. Tentatively scheduled for 3/23 with Dr. Kc and Dr. Lutz. We will call him at a later date with specific arrival time and PAT details. He has my contact information for future questions.

## 2023-03-13 ENCOUNTER — PREP FOR SURGERY (OUTPATIENT)
Dept: OTHER | Facility: HOSPITAL | Age: 68
End: 2023-03-13
Payer: MEDICARE

## 2023-03-13 DIAGNOSIS — I35.0 NONRHEUMATIC AORTIC VALVE STENOSIS: Primary | ICD-10-CM

## 2023-03-13 DIAGNOSIS — I35.0 AORTIC STENOSIS, SEVERE: Primary | ICD-10-CM

## 2023-03-14 RX ORDER — CHLORHEXIDINE GLUCONATE 0.12 MG/ML
15 RINSE ORAL ONCE
Status: CANCELLED | OUTPATIENT
Start: 2023-03-23 | End: 2023-03-23

## 2023-03-14 RX ORDER — ASPIRIN 325 MG
325 TABLET ORAL NIGHTLY
Status: CANCELLED | OUTPATIENT
Start: 2023-03-22 | End: 2023-03-23

## 2023-03-14 RX ORDER — CHLORHEXIDINE GLUCONATE 500 MG/1
1 CLOTH TOPICAL EVERY 12 HOURS PRN
Status: CANCELLED | OUTPATIENT
Start: 2023-03-22

## 2023-03-14 RX ORDER — CEFAZOLIN SODIUM 2 G/100ML
2 INJECTION, SOLUTION INTRAVENOUS ONCE
Status: CANCELLED | OUTPATIENT
Start: 2023-03-23 | End: 2023-03-23

## 2023-03-14 RX ORDER — CHLORHEXIDINE GLUCONATE 500 MG/1
1 CLOTH TOPICAL EVERY 12 HOURS PRN
Status: CANCELLED | OUTPATIENT
Start: 2023-03-23

## 2023-03-14 RX ORDER — NITROGLYCERIN 0.4 MG/1
0.4 TABLET SUBLINGUAL
Status: CANCELLED | OUTPATIENT
Start: 2023-03-23

## 2023-03-22 ENCOUNTER — PRE-ADMISSION TESTING (OUTPATIENT)
Dept: PREADMISSION TESTING | Facility: HOSPITAL | Age: 68
DRG: 267 | End: 2023-03-22
Payer: MEDICARE

## 2023-03-22 ENCOUNTER — DOCUMENTATION (OUTPATIENT)
Dept: CARDIOLOGY | Facility: CLINIC | Age: 68
End: 2023-03-22
Payer: MEDICARE

## 2023-03-22 ENCOUNTER — HOSPITAL ENCOUNTER (OUTPATIENT)
Dept: PULMONOLOGY | Facility: HOSPITAL | Age: 68
Discharge: HOME | DRG: 267 | End: 2023-03-22
Payer: MEDICARE

## 2023-03-22 ENCOUNTER — HOSPITAL ENCOUNTER (OUTPATIENT)
Dept: GENERAL RADIOLOGY | Facility: HOSPITAL | Age: 68
Discharge: HOME | DRG: 267 | End: 2023-03-22
Payer: MEDICARE

## 2023-03-22 VITALS — BODY MASS INDEX: 27.18 KG/M2 | HEIGHT: 68 IN | WEIGHT: 179.34 LBS | OXYGEN SATURATION: 99 %

## 2023-03-22 DIAGNOSIS — I35.0 NONRHEUMATIC AORTIC VALVE STENOSIS: ICD-10-CM

## 2023-03-22 LAB
ABO GROUP BLD: NORMAL
ALBUMIN SERPL-MCNC: 4 G/DL (ref 3.5–5.2)
ALBUMIN/GLOB SERPL: 1.1 G/DL
ALP SERPL-CCNC: 81 U/L (ref 39–117)
ALT SERPL W P-5'-P-CCNC: 16 U/L (ref 1–41)
AMPHET+METHAMPHET UR QL: NEGATIVE
AMPHETAMINES UR QL: NEGATIVE
ANION GAP SERPL CALCULATED.3IONS-SCNC: 8 MMOL/L (ref 5–15)
APTT PPP: 33.2 SECONDS (ref 22–39)
AST SERPL-CCNC: 31 U/L (ref 1–40)
BARBITURATES UR QL SCN: NEGATIVE
BASOPHILS # BLD AUTO: 0.06 10*3/MM3 (ref 0–0.2)
BASOPHILS NFR BLD AUTO: 0.8 % (ref 0–1.5)
BENZODIAZ UR QL SCN: NEGATIVE
BILIRUB SERPL-MCNC: 0.3 MG/DL (ref 0–1.2)
BLD GP AB SCN SERPL QL: NEGATIVE
BUN SERPL-MCNC: 12 MG/DL (ref 8–23)
BUN/CREAT SERPL: 12.6 (ref 7–25)
BUPRENORPHINE SERPL-MCNC: NEGATIVE NG/ML
CALCIUM SPEC-SCNC: 9.2 MG/DL (ref 8.6–10.5)
CANNABINOIDS SERPL QL: NEGATIVE
CHLORIDE SERPL-SCNC: 96 MMOL/L (ref 98–107)
CO2 SERPL-SCNC: 28 MMOL/L (ref 22–29)
COCAINE UR QL: NEGATIVE
CREAT SERPL-MCNC: 0.95 MG/DL (ref 0.76–1.27)
DEPRECATED RDW RBC AUTO: 43.7 FL (ref 37–54)
EGFRCR SERPLBLD CKD-EPI 2021: 87.7 ML/MIN/1.73
EOSINOPHIL # BLD AUTO: 0.48 10*3/MM3 (ref 0–0.4)
EOSINOPHIL NFR BLD AUTO: 6.2 % (ref 0.3–6.2)
ERYTHROCYTE [DISTWIDTH] IN BLOOD BY AUTOMATED COUNT: 12.9 % (ref 12.3–15.4)
GLOBULIN UR ELPH-MCNC: 3.6 GM/DL
GLUCOSE SERPL-MCNC: 95 MG/DL (ref 65–99)
HBA1C MFR BLD: 5.1 % (ref 4.8–5.6)
HCT VFR BLD AUTO: 38.1 % (ref 37.5–51)
HGB BLD-MCNC: 12.6 G/DL (ref 13–17.7)
IMM GRANULOCYTES # BLD AUTO: 0.04 10*3/MM3 (ref 0–0.05)
IMM GRANULOCYTES NFR BLD AUTO: 0.5 % (ref 0–0.5)
INR PPP: 1.01 (ref 0.84–1.13)
LYMPHOCYTES # BLD AUTO: 1.34 10*3/MM3 (ref 0.7–3.1)
LYMPHOCYTES NFR BLD AUTO: 17.2 % (ref 19.6–45.3)
MAGNESIUM SERPL-MCNC: 1.7 MG/DL (ref 1.6–2.4)
MCH RBC QN AUTO: 30.6 PG (ref 26.6–33)
MCHC RBC AUTO-ENTMCNC: 33.1 G/DL (ref 31.5–35.7)
MCV RBC AUTO: 92.5 FL (ref 79–97)
METHADONE UR QL SCN: NEGATIVE
MONOCYTES # BLD AUTO: 0.69 10*3/MM3 (ref 0.1–0.9)
MONOCYTES NFR BLD AUTO: 8.9 % (ref 5–12)
NEUTROPHILS NFR BLD AUTO: 5.16 10*3/MM3 (ref 1.7–7)
NEUTROPHILS NFR BLD AUTO: 66.4 % (ref 42.7–76)
NRBC BLD AUTO-RTO: 0 /100 WBC (ref 0–0.2)
OPIATES UR QL: NEGATIVE
OXYCODONE UR QL SCN: NEGATIVE
PA ADP PRP-ACNC: 287 PRU
PCP UR QL SCN: NEGATIVE
PLATELET # BLD AUTO: 236 10*3/MM3 (ref 140–450)
PMV BLD AUTO: 9.2 FL (ref 6–12)
POTASSIUM SERPL-SCNC: 4.1 MMOL/L (ref 3.5–5.2)
PROPOXYPH UR QL: NEGATIVE
PROT SERPL-MCNC: 7.6 G/DL (ref 6–8.5)
PROTHROMBIN TIME: 13.2 SECONDS (ref 11.4–14.4)
RBC # BLD AUTO: 4.12 10*6/MM3 (ref 4.14–5.8)
RH BLD: POSITIVE
SODIUM SERPL-SCNC: 132 MMOL/L (ref 136–145)
T&S EXPIRATION DATE: NORMAL
TRICYCLICS UR QL SCN: NEGATIVE
WBC NRBC COR # BLD: 7.77 10*3/MM3 (ref 3.4–10.8)

## 2023-03-22 PROCEDURE — 86850 RBC ANTIBODY SCREEN: CPT

## 2023-03-22 PROCEDURE — 80053 COMPREHEN METABOLIC PANEL: CPT

## 2023-03-22 PROCEDURE — 86901 BLOOD TYPING SEROLOGIC RH(D): CPT

## 2023-03-22 PROCEDURE — 94010 BREATHING CAPACITY TEST: CPT

## 2023-03-22 PROCEDURE — 36415 COLL VENOUS BLD VENIPUNCTURE: CPT

## 2023-03-22 PROCEDURE — 86923 COMPATIBILITY TEST ELECTRIC: CPT

## 2023-03-22 PROCEDURE — 83735 ASSAY OF MAGNESIUM: CPT

## 2023-03-22 PROCEDURE — 94010 BREATHING CAPACITY TEST: CPT | Performed by: INTERNAL MEDICINE

## 2023-03-22 PROCEDURE — 80306 DRUG TEST PRSMV INSTRMNT: CPT

## 2023-03-22 PROCEDURE — 83036 HEMOGLOBIN GLYCOSYLATED A1C: CPT

## 2023-03-22 PROCEDURE — 85730 THROMBOPLASTIN TIME PARTIAL: CPT

## 2023-03-22 PROCEDURE — 86900 BLOOD TYPING SEROLOGIC ABO: CPT

## 2023-03-22 PROCEDURE — 85025 COMPLETE CBC W/AUTO DIFF WBC: CPT

## 2023-03-22 PROCEDURE — 85576 BLOOD PLATELET AGGREGATION: CPT

## 2023-03-22 PROCEDURE — 71046 X-RAY EXAM CHEST 2 VIEWS: CPT

## 2023-03-22 PROCEDURE — 85610 PROTHROMBIN TIME: CPT

## 2023-03-22 RX ORDER — ASPIRIN 325 MG
325 TABLET ORAL NIGHTLY
Status: DISCONTINUED | OUTPATIENT
Start: 2023-03-22 | End: 2023-03-23

## 2023-03-22 RX ORDER — CHLORHEXIDINE GLUCONATE 500 MG/1
1 CLOTH TOPICAL EVERY 12 HOURS PRN
Status: DISCONTINUED | OUTPATIENT
Start: 2023-03-22 | End: 2023-03-23

## 2023-03-22 RX ORDER — MULTIPLE VITAMINS W/ MINERALS TAB 9MG-400MCG
1 TAB ORAL DAILY
COMMUNITY

## 2023-03-22 NOTE — PAT
An arrival time for procedure was not provided during PAT visit. If patient had any questions or concerns about their arrival time, they were instructed to contact their surgeon/physician.  Additionally, if the patient referred to an arrival time that was acquired from their my chart account, patient was encouraged to verify that time with their surgeon/physician. Arrival times are NOT provided in Pre Admission Testing Department.    Patient/family provided a [facility] Heart Surgery book (if not already provided by the CT surgeon's office).  Encouraged patient and family to read the Heart Surgery book if they had not already done so.     Education during PAT visit included general perioperative instructions that are routine for all surgical patients (PAT PASS, wipes, directions to pre-op,etc.).  Additionally, a handout was provided and discussed that stressed the importance of Honesty, Incentive Spirometry use, Ambulation, and Pain control.  This handout also explained the tubes in place during immediate post op recovery.  Verbal instructions given as well.     Patient and/or family verbalized understanding of education and reinforcement was provided as needed.    Instructed patient to take 325 mg of Aspirin the night before heart surgery as per CT surgeon's order.  Patient and/or family verbalized understanding.      Blood bank bracelet applied to patient during Pre Admission Testing visit.  Patient instructed not to remove from arm until after procedure and they are discharged from the hospital.  Explained to patient that they may shower and get the bracelet wet, but not to immerse under water for longer periods (bathing, swimming, hand dishwashing, etc).  Patient verbalized understanding.    Patient to apply Chlorhexadine wipes  to surgical area (as instructed) the night before procedure and the AM of procedure. Wipes provided.    Patient instructed to drink 20 ounces of Gatorade and it needs to be  completed 1 hour (for Main OR patients) or 2 hours (scheduled  section & [facility] patients) before given arrival time for procedure (NO RED Gatorade)    Patient verbalized understanding.    Patient viewed general PAT education video as instructed in their preoperative information received from their surgeon.  Patient stated the general PAT education video was viewed in its entirety and survey completed.  Copies of PAT general education handouts (Incentive Spirometry, Meds to Beds Program, Patient Belongings, Pre-op skin preparation instructions, Blood Glucose testing, Visitor policy, Surgery FAQ, Code H) distributed to patient if not printed. Education related to the PAT pass and skin preparation for surgery (if applicable) completed in PAT as a reinforcement to PAT education video. Patient instructed to return PAT pass provided today as well as completed skin preparation sheet (if applicable) on the day of procedure.     Additionally if patient had not viewed video yet but intended to view it at home or in our waiting area, then referred them to the handout with QR code/link provided during PAT visit.  Instructed patient to complete survey after viewing the video in its entirety.  Encouraged patient/family to read PAT general education handouts thoroughly and notify PAT staff with any questions or concerns. Patient verbalized understanding of all information and priority content.    Patient denies any current skin issues.     Blood bank bracelet applied to patient during Pre Admission Testing visit.  Patient instructed not to remove from arm until after procedure and they are discharged from the hospital.  Explained to patient that they may shower and get the bracelet wet, but not to immerse under water for longer periods (bathing, swimming, hand dishwashing, etc).  Patient verbalized understanding.    Patient directed to Radiology Department for CXR after Pre Admission Testing Appointment.     Patient  directed to Respiratory Department after Pre Admission Testing visit for Pulmonary Function Test.    EKG FROM 2/1/23. PT DENIES CP/SOA.

## 2023-03-22 NOTE — PROGRESS NOTES
Met with patient and spouse in PAT. He had an episode of chest pain and dizziness while working in the yard yesterday, he was lifting bags of mulch and had to stop for his symptoms to resolve. He mild MARTINEZ, denies BLE edema. We discussed TAVR procedure tomorrow- previous discussions with Dr. Kc and Dr. Lutz based on Mcgregor ray notes, associated risks and expected recovery course. Discussed pulmonary findings on his CT, he reports having latent TB. Will discuss with the Heart Team and with his PCP for future follow up.       NYHA II  KCCQ: 51/70  5MWT: 5.91s/5 meters

## 2023-03-23 ENCOUNTER — ANCILLARY PROCEDURE (OUTPATIENT)
Dept: PERIOP | Facility: HOSPITAL | Age: 68
DRG: 267 | End: 2023-03-23
Payer: MEDICARE

## 2023-03-23 ENCOUNTER — HOSPITAL ENCOUNTER (INPATIENT)
Facility: HOSPITAL | Age: 68
LOS: 1 days | Discharge: HOME | DRG: 267 | End: 2023-03-24
Attending: THORACIC SURGERY (CARDIOTHORACIC VASCULAR SURGERY) | Admitting: THORACIC SURGERY (CARDIOTHORACIC VASCULAR SURGERY)
Payer: MEDICARE

## 2023-03-23 ENCOUNTER — ANESTHESIA EVENT (OUTPATIENT)
Dept: PERIOP | Facility: HOSPITAL | Age: 68
DRG: 267 | End: 2023-03-23
Payer: MEDICARE

## 2023-03-23 ENCOUNTER — ANESTHESIA (OUTPATIENT)
Dept: PERIOP | Facility: HOSPITAL | Age: 68
DRG: 267 | End: 2023-03-23
Payer: MEDICARE

## 2023-03-23 ENCOUNTER — ANESTHESIA EVENT CONVERTED (OUTPATIENT)
Dept: ANESTHESIOLOGY | Facility: HOSPITAL | Age: 68
DRG: 267 | End: 2023-03-23
Payer: MEDICARE

## 2023-03-23 DIAGNOSIS — I35.0 NONRHEUMATIC AORTIC VALVE STENOSIS: ICD-10-CM

## 2023-03-23 DIAGNOSIS — I35.0 AORTIC STENOSIS, SEVERE: ICD-10-CM

## 2023-03-23 DIAGNOSIS — I35.0 AORTIC STENOSIS, SEVERE: Primary | ICD-10-CM

## 2023-03-23 DIAGNOSIS — I35.9 AORTIC VALVE DISORDER: ICD-10-CM

## 2023-03-23 PROBLEM — Z95.3 STATUS POST TRANSCATHETER AORTIC VALVE REPLACEMENT (TAVR) USING BIOPROSTHESIS: Status: ACTIVE | Noted: 2023-03-23

## 2023-03-23 LAB
ABO GROUP BLD: NORMAL
ACT BLD: 143 SECONDS (ref 82–152)
ANION GAP SERPL CALCULATED.3IONS-SCNC: 6 MMOL/L (ref 5–15)
APTT PPP: 42 SECONDS (ref 22–39)
BUN SERPL-MCNC: 13 MG/DL (ref 8–23)
BUN/CREAT SERPL: 15.9 (ref 7–25)
CALCIUM SPEC-SCNC: 7.9 MG/DL (ref 8.6–10.5)
CHLORIDE SERPL-SCNC: 103 MMOL/L (ref 98–107)
CO2 SERPL-SCNC: 24 MMOL/L (ref 22–29)
CREAT SERPL-MCNC: 0.82 MG/DL (ref 0.76–1.27)
DEPRECATED RDW RBC AUTO: 42.8 FL (ref 37–54)
EGFRCR SERPLBLD CKD-EPI 2021: 96.3 ML/MIN/1.73
ERYTHROCYTE [DISTWIDTH] IN BLOOD BY AUTOMATED COUNT: 12.9 % (ref 12.3–15.4)
GLUCOSE BLDC GLUCOMTR-MCNC: 109 MG/DL (ref 70–130)
GLUCOSE SERPL-MCNC: 143 MG/DL (ref 65–99)
HCT VFR BLD AUTO: 33.8 % (ref 37.5–51)
HGB BLD-MCNC: 11.5 G/DL (ref 13–17.7)
MCH RBC QN AUTO: 30.8 PG (ref 26.6–33)
MCHC RBC AUTO-ENTMCNC: 34 G/DL (ref 31.5–35.7)
MCV RBC AUTO: 90.6 FL (ref 79–97)
PLATELET # BLD AUTO: 202 10*3/MM3 (ref 140–450)
PMV BLD AUTO: 8.9 FL (ref 6–12)
POTASSIUM SERPL-SCNC: 4.3 MMOL/L (ref 3.5–5.2)
RBC # BLD AUTO: 3.73 10*6/MM3 (ref 4.14–5.8)
RH BLD: POSITIVE
SODIUM SERPL-SCNC: 133 MMOL/L (ref 136–145)
WBC NRBC COR # BLD: 8.02 10*3/MM3 (ref 3.4–10.8)

## 2023-03-23 PROCEDURE — B3101ZZ FLUOROSCOPY OF THORACIC AORTA USING LOW OSMOLAR CONTRAST: ICD-10-PCS | Performed by: THORACIC SURGERY (CARDIOTHORACIC VASCULAR SURGERY)

## 2023-03-23 PROCEDURE — C1769 GUIDE WIRE: HCPCS | Performed by: THORACIC SURGERY (CARDIOTHORACIC VASCULAR SURGERY)

## 2023-03-23 PROCEDURE — 85730 THROMBOPLASTIN TIME PARTIAL: CPT | Performed by: THORACIC SURGERY (CARDIOTHORACIC VASCULAR SURGERY)

## 2023-03-23 PROCEDURE — 25010000002 ONDANSETRON PER 1 MG: Performed by: NURSE ANESTHETIST, CERTIFIED REGISTERED

## 2023-03-23 PROCEDURE — 25010000002 HEPARIN (PORCINE) PER 1000 UNITS: Performed by: NURSE ANESTHETIST, CERTIFIED REGISTERED

## 2023-03-23 PROCEDURE — 25010000002 DEXAMETHASONE PER 1 MG: Performed by: NURSE ANESTHETIST, CERTIFIED REGISTERED

## 2023-03-23 PROCEDURE — 25010000002 HEPARIN (PORCINE) PER 1000 UNITS: Performed by: THORACIC SURGERY (CARDIOTHORACIC VASCULAR SURGERY)

## 2023-03-23 PROCEDURE — C1760 CLOSURE DEV, VASC: HCPCS | Performed by: THORACIC SURGERY (CARDIOTHORACIC VASCULAR SURGERY)

## 2023-03-23 PROCEDURE — 25010000002 FENTANYL CITRATE (PF) 100 MCG/2ML SOLUTION: Performed by: NURSE ANESTHETIST, CERTIFIED REGISTERED

## 2023-03-23 PROCEDURE — C1889 IMPLANT/INSERT DEVICE, NOC: HCPCS | Performed by: THORACIC SURGERY (CARDIOTHORACIC VASCULAR SURGERY)

## 2023-03-23 PROCEDURE — 82947 ASSAY GLUCOSE BLOOD QUANT: CPT

## 2023-03-23 PROCEDURE — 99232 SBSQ HOSP IP/OBS MODERATE 35: CPT | Performed by: INTERNAL MEDICINE

## 2023-03-23 PROCEDURE — C1887 CATHETER, GUIDING: HCPCS | Performed by: THORACIC SURGERY (CARDIOTHORACIC VASCULAR SURGERY)

## 2023-03-23 PROCEDURE — 82330 ASSAY OF CALCIUM: CPT

## 2023-03-23 PROCEDURE — 84132 ASSAY OF SERUM POTASSIUM: CPT

## 2023-03-23 PROCEDURE — 85014 HEMATOCRIT: CPT

## 2023-03-23 PROCEDURE — 82962 GLUCOSE BLOOD TEST: CPT

## 2023-03-23 PROCEDURE — B41G1ZZ FLUOROSCOPY OF LEFT LOWER EXTREMITY ARTERIES USING LOW OSMOLAR CONTRAST: ICD-10-PCS | Performed by: THORACIC SURGERY (CARDIOTHORACIC VASCULAR SURGERY)

## 2023-03-23 PROCEDURE — 85347 COAGULATION TIME ACTIVATED: CPT

## 2023-03-23 PROCEDURE — 93355 ECHO TRANSESOPHAGEAL (TEE): CPT

## 2023-03-23 PROCEDURE — 82803 BLOOD GASES ANY COMBINATION: CPT

## 2023-03-23 PROCEDURE — 25010000002 PHENYLEPHRINE 10 MG/ML SOLUTION 5 ML VIAL: Performed by: NURSE ANESTHETIST, CERTIFIED REGISTERED

## 2023-03-23 PROCEDURE — 84295 ASSAY OF SERUM SODIUM: CPT

## 2023-03-23 PROCEDURE — 93355 ECHO TRANSESOPHAGEAL (TEE): CPT | Performed by: INTERNAL MEDICINE

## 2023-03-23 PROCEDURE — 25510000001 IOPAMIDOL PER 1 ML: Performed by: THORACIC SURGERY (CARDIOTHORACIC VASCULAR SURGERY)

## 2023-03-23 PROCEDURE — 25010000002 CEFAZOLIN IN DEXTROSE 2-4 GM/100ML-% SOLUTION: Performed by: PHYSICIAN ASSISTANT

## 2023-03-23 PROCEDURE — 86900 BLOOD TYPING SEROLOGIC ABO: CPT

## 2023-03-23 PROCEDURE — C1894 INTRO/SHEATH, NON-LASER: HCPCS | Performed by: THORACIC SURGERY (CARDIOTHORACIC VASCULAR SURGERY)

## 2023-03-23 PROCEDURE — 25010000002 PROPOFOL 10 MG/ML EMULSION: Performed by: NURSE ANESTHETIST, CERTIFIED REGISTERED

## 2023-03-23 PROCEDURE — 25010000002 PROTAMINE SULFATE PER 10 MG: Performed by: NURSE ANESTHETIST, CERTIFIED REGISTERED

## 2023-03-23 PROCEDURE — 86901 BLOOD TYPING SEROLOGIC RH(D): CPT

## 2023-03-23 PROCEDURE — 02RF38Z REPLACEMENT OF AORTIC VALVE WITH ZOOPLASTIC TISSUE, PERCUTANEOUS APPROACH: ICD-10-PCS | Performed by: THORACIC SURGERY (CARDIOTHORACIC VASCULAR SURGERY)

## 2023-03-23 PROCEDURE — 80048 BASIC METABOLIC PNL TOTAL CA: CPT | Performed by: STUDENT IN AN ORGANIZED HEALTH CARE EDUCATION/TRAINING PROGRAM

## 2023-03-23 PROCEDURE — 85027 COMPLETE CBC AUTOMATED: CPT | Performed by: STUDENT IN AN ORGANIZED HEALTH CARE EDUCATION/TRAINING PROGRAM

## 2023-03-23 PROCEDURE — 33361 REPLACE AORTIC VALVE PERQ: CPT | Performed by: THORACIC SURGERY (CARDIOTHORACIC VASCULAR SURGERY)

## 2023-03-23 PROCEDURE — 33361 REPLACE AORTIC VALVE PERQ: CPT | Performed by: INTERNAL MEDICINE

## 2023-03-23 DEVICE — VLV HEART TRNSCATH SAPIEN3 29MM: Type: IMPLANTABLE DEVICE | Site: HEART | Status: FUNCTIONAL

## 2023-03-23 RX ORDER — CEFAZOLIN SODIUM 2 G/100ML
2 INJECTION, SOLUTION INTRAVENOUS ONCE
Status: COMPLETED | OUTPATIENT
Start: 2023-03-23 | End: 2023-03-23

## 2023-03-23 RX ORDER — FAMOTIDINE 20 MG/1
20 TABLET, FILM COATED ORAL ONCE
Status: COMPLETED | OUTPATIENT
Start: 2023-03-23 | End: 2023-03-23

## 2023-03-23 RX ORDER — ONDANSETRON 2 MG/ML
4 INJECTION INTRAMUSCULAR; INTRAVENOUS EVERY 6 HOURS PRN
Status: DISCONTINUED | OUTPATIENT
Start: 2023-03-23 | End: 2023-03-24 | Stop reason: HOSPADM

## 2023-03-23 RX ORDER — FENTANYL CITRATE 50 UG/ML
INJECTION, SOLUTION INTRAMUSCULAR; INTRAVENOUS AS NEEDED
Status: DISCONTINUED | OUTPATIENT
Start: 2023-03-23 | End: 2023-03-23 | Stop reason: SURG

## 2023-03-23 RX ORDER — SODIUM CHLORIDE 9 MG/ML
250 INJECTION, SOLUTION INTRAVENOUS ONCE AS NEEDED
Status: DISCONTINUED | OUTPATIENT
Start: 2023-03-23 | End: 2023-03-24 | Stop reason: HOSPADM

## 2023-03-23 RX ORDER — LIDOCAINE HYDROCHLORIDE 10 MG/ML
0.5 INJECTION, SOLUTION EPIDURAL; INFILTRATION; INTRACAUDAL; PERINEURAL ONCE AS NEEDED
Status: COMPLETED | OUTPATIENT
Start: 2023-03-23 | End: 2023-03-23

## 2023-03-23 RX ORDER — BUPIVACAINE HCL/0.9 % NACL/PF 0.125 %
PLASTIC BAG, INJECTION (ML) EPIDURAL AS NEEDED
Status: DISCONTINUED | OUTPATIENT
Start: 2023-03-23 | End: 2023-03-23 | Stop reason: SURG

## 2023-03-23 RX ORDER — HEPARIN SODIUM 1000 [USP'U]/ML
INJECTION, SOLUTION INTRAVENOUS; SUBCUTANEOUS AS NEEDED
Status: DISCONTINUED | OUTPATIENT
Start: 2023-03-23 | End: 2023-03-23 | Stop reason: SURG

## 2023-03-23 RX ORDER — ATORVASTATIN CALCIUM 20 MG/1
20 TABLET, FILM COATED ORAL DAILY
Status: DISCONTINUED | OUTPATIENT
Start: 2023-03-23 | End: 2023-03-24 | Stop reason: HOSPADM

## 2023-03-23 RX ORDER — PROTAMINE SULFATE 10 MG/ML
INJECTION, SOLUTION INTRAVENOUS AS NEEDED
Status: DISCONTINUED | OUTPATIENT
Start: 2023-03-23 | End: 2023-03-23 | Stop reason: SURG

## 2023-03-23 RX ORDER — MIDAZOLAM HYDROCHLORIDE 1 MG/ML
0.5 INJECTION INTRAMUSCULAR; INTRAVENOUS
Status: DISCONTINUED | OUTPATIENT
Start: 2023-03-23 | End: 2023-03-23 | Stop reason: HOSPADM

## 2023-03-23 RX ORDER — ONDANSETRON 2 MG/ML
INJECTION INTRAMUSCULAR; INTRAVENOUS AS NEEDED
Status: DISCONTINUED | OUTPATIENT
Start: 2023-03-23 | End: 2023-03-23 | Stop reason: SURG

## 2023-03-23 RX ORDER — ROCURONIUM BROMIDE 10 MG/ML
INJECTION, SOLUTION INTRAVENOUS AS NEEDED
Status: DISCONTINUED | OUTPATIENT
Start: 2023-03-23 | End: 2023-03-23 | Stop reason: SURG

## 2023-03-23 RX ORDER — SODIUM CHLORIDE 0.9 % (FLUSH) 0.9 %
10 SYRINGE (ML) INJECTION AS NEEDED
Status: DISCONTINUED | OUTPATIENT
Start: 2023-03-23 | End: 2023-03-23 | Stop reason: HOSPADM

## 2023-03-23 RX ORDER — SODIUM CHLORIDE, SODIUM LACTATE, POTASSIUM CHLORIDE, CALCIUM CHLORIDE 600; 310; 30; 20 MG/100ML; MG/100ML; MG/100ML; MG/100ML
9 INJECTION, SOLUTION INTRAVENOUS CONTINUOUS
Status: DISCONTINUED | OUTPATIENT
Start: 2023-03-23 | End: 2023-03-23

## 2023-03-23 RX ORDER — CHLORHEXIDINE GLUCONATE 500 MG/1
1 CLOTH TOPICAL EVERY 12 HOURS PRN
Status: DISCONTINUED | OUTPATIENT
Start: 2023-03-23 | End: 2023-03-23 | Stop reason: HOSPADM

## 2023-03-23 RX ORDER — MORPHINE SULFATE 2 MG/ML
2 INJECTION, SOLUTION INTRAMUSCULAR; INTRAVENOUS EVERY 4 HOURS PRN
Status: DISCONTINUED | OUTPATIENT
Start: 2023-03-23 | End: 2023-03-24 | Stop reason: HOSPADM

## 2023-03-23 RX ORDER — SODIUM CHLORIDE 9 MG/ML
40 INJECTION, SOLUTION INTRAVENOUS AS NEEDED
Status: CANCELLED | OUTPATIENT
Start: 2023-03-23

## 2023-03-23 RX ORDER — HYDRALAZINE HYDROCHLORIDE 20 MG/ML
10 INJECTION INTRAMUSCULAR; INTRAVENOUS EVERY 6 HOURS PRN
Status: DISCONTINUED | OUTPATIENT
Start: 2023-03-23 | End: 2023-03-24 | Stop reason: HOSPADM

## 2023-03-23 RX ORDER — HYDROCODONE BITARTRATE AND ACETAMINOPHEN 5; 325 MG/1; MG/1
1 TABLET ORAL EVERY 6 HOURS PRN
Status: DISCONTINUED | OUTPATIENT
Start: 2023-03-23 | End: 2023-03-24 | Stop reason: HOSPADM

## 2023-03-23 RX ORDER — SODIUM CHLORIDE 9 MG/ML
100 INJECTION, SOLUTION INTRAVENOUS CONTINUOUS
Status: ACTIVE | OUTPATIENT
Start: 2023-03-23 | End: 2023-03-23

## 2023-03-23 RX ORDER — NICARDIPINE HYDROCHLORIDE 2.5 MG/ML
INJECTION INTRAVENOUS
Status: DISCONTINUED
Start: 2023-03-23 | End: 2023-03-24 | Stop reason: HOSPADM

## 2023-03-23 RX ORDER — PROPOFOL 10 MG/ML
VIAL (ML) INTRAVENOUS AS NEEDED
Status: DISCONTINUED | OUTPATIENT
Start: 2023-03-23 | End: 2023-03-23 | Stop reason: SURG

## 2023-03-23 RX ORDER — ETOMIDATE 2 MG/ML
INJECTION INTRAVENOUS AS NEEDED
Status: DISCONTINUED | OUTPATIENT
Start: 2023-03-23 | End: 2023-03-23 | Stop reason: SURG

## 2023-03-23 RX ORDER — LIDOCAINE HYDROCHLORIDE 10 MG/ML
INJECTION, SOLUTION EPIDURAL; INFILTRATION; INTRACAUDAL; PERINEURAL AS NEEDED
Status: DISCONTINUED | OUTPATIENT
Start: 2023-03-23 | End: 2023-03-23 | Stop reason: SURG

## 2023-03-23 RX ORDER — NITROGLYCERIN 0.4 MG/1
0.4 TABLET SUBLINGUAL
Status: DISCONTINUED | OUTPATIENT
Start: 2023-03-23 | End: 2023-03-23 | Stop reason: HOSPADM

## 2023-03-23 RX ORDER — SODIUM CHLORIDE 9 MG/ML
INJECTION, SOLUTION INTRAVENOUS AS NEEDED
Status: DISCONTINUED | OUTPATIENT
Start: 2023-03-23 | End: 2023-03-23 | Stop reason: HOSPADM

## 2023-03-23 RX ORDER — SODIUM CHLORIDE 0.9 % (FLUSH) 0.9 %
10 SYRINGE (ML) INJECTION EVERY 12 HOURS SCHEDULED
Status: DISCONTINUED | OUTPATIENT
Start: 2023-03-23 | End: 2023-03-23 | Stop reason: HOSPADM

## 2023-03-23 RX ORDER — ACETAMINOPHEN 325 MG/1
650 TABLET ORAL EVERY 4 HOURS PRN
Status: DISCONTINUED | OUTPATIENT
Start: 2023-03-23 | End: 2023-03-24 | Stop reason: HOSPADM

## 2023-03-23 RX ORDER — FENTANYL CITRATE 50 UG/ML
50 INJECTION, SOLUTION INTRAMUSCULAR; INTRAVENOUS
Status: CANCELLED | OUTPATIENT
Start: 2023-03-23

## 2023-03-23 RX ORDER — LABETALOL HYDROCHLORIDE 5 MG/ML
10 INJECTION, SOLUTION INTRAVENOUS
Status: DISCONTINUED | OUTPATIENT
Start: 2023-03-23 | End: 2023-03-24 | Stop reason: HOSPADM

## 2023-03-23 RX ORDER — CHLORHEXIDINE GLUCONATE 0.12 MG/ML
15 RINSE ORAL ONCE
Status: COMPLETED | OUTPATIENT
Start: 2023-03-23 | End: 2023-03-23

## 2023-03-23 RX ORDER — FAMOTIDINE 10 MG/ML
20 INJECTION, SOLUTION INTRAVENOUS ONCE
Status: CANCELLED | OUTPATIENT
Start: 2023-03-23 | End: 2023-03-23

## 2023-03-23 RX ORDER — LOSARTAN POTASSIUM 50 MG/1
50 TABLET ORAL DAILY
Status: DISCONTINUED | OUTPATIENT
Start: 2023-03-23 | End: 2023-03-24 | Stop reason: HOSPADM

## 2023-03-23 RX ORDER — ONDANSETRON 2 MG/ML
4 INJECTION INTRAMUSCULAR; INTRAVENOUS ONCE AS NEEDED
Status: CANCELLED | OUTPATIENT
Start: 2023-03-23

## 2023-03-23 RX ORDER — ONDANSETRON 4 MG/1
4 TABLET, FILM COATED ORAL EVERY 6 HOURS PRN
Status: DISCONTINUED | OUTPATIENT
Start: 2023-03-23 | End: 2023-03-24 | Stop reason: HOSPADM

## 2023-03-23 RX ORDER — DEXAMETHASONE SODIUM PHOSPHATE 4 MG/ML
INJECTION, SOLUTION INTRA-ARTICULAR; INTRALESIONAL; INTRAMUSCULAR; INTRAVENOUS; SOFT TISSUE AS NEEDED
Status: DISCONTINUED | OUTPATIENT
Start: 2023-03-23 | End: 2023-03-23 | Stop reason: SURG

## 2023-03-23 RX ORDER — ASPIRIN 81 MG/1
81 TABLET ORAL DAILY
Status: DISCONTINUED | OUTPATIENT
Start: 2023-03-24 | End: 2023-03-24 | Stop reason: HOSPADM

## 2023-03-23 RX ADMIN — ETOMIDATE 20 MG: 20 INJECTION, SOLUTION INTRAVENOUS at 08:34

## 2023-03-23 RX ADMIN — MUPIROCIN 1 APPLICATION: 20 OINTMENT TOPICAL at 08:10

## 2023-03-23 RX ADMIN — PROPOFOL 50 MG: 10 INJECTION, EMULSION INTRAVENOUS at 09:26

## 2023-03-23 RX ADMIN — LOSARTAN POTASSIUM 50 MG: 50 TABLET, FILM COATED ORAL at 13:13

## 2023-03-23 RX ADMIN — Medication 100 MCG: at 08:42

## 2023-03-23 RX ADMIN — ROCURONIUM BROMIDE 50 MG: 10 INJECTION INTRAVENOUS at 08:34

## 2023-03-23 RX ADMIN — SODIUM CHLORIDE, POTASSIUM CHLORIDE, SODIUM LACTATE AND CALCIUM CHLORIDE 9 ML/HR: 600; 310; 30; 20 INJECTION, SOLUTION INTRAVENOUS at 08:10

## 2023-03-23 RX ADMIN — SODIUM CHLORIDE 100 ML/HR: 9 INJECTION, SOLUTION INTRAVENOUS at 10:03

## 2023-03-23 RX ADMIN — HYDROCODONE BITARTRATE AND ACETAMINOPHEN 1 TABLET: 5; 325 TABLET ORAL at 22:03

## 2023-03-23 RX ADMIN — ATORVASTATIN CALCIUM 20 MG: 20 TABLET, FILM COATED ORAL at 13:13

## 2023-03-23 RX ADMIN — DEXAMETHASONE SODIUM PHOSPHATE 4 MG: 4 INJECTION, SOLUTION INTRAMUSCULAR; INTRAVENOUS at 08:34

## 2023-03-23 RX ADMIN — CEFAZOLIN SODIUM 2 G: 2 INJECTION, SOLUTION INTRAVENOUS at 08:34

## 2023-03-23 RX ADMIN — PROPOFOL 50 MG: 10 INJECTION, EMULSION INTRAVENOUS at 08:34

## 2023-03-23 RX ADMIN — CHLORHEXIDINE GLUCONATE 0.12% ORAL RINSE 15 ML: 1.2 LIQUID ORAL at 08:10

## 2023-03-23 RX ADMIN — HEPARIN SODIUM 15000 UNITS: 1000 INJECTION, SOLUTION INTRAVENOUS; SUBCUTANEOUS at 09:00

## 2023-03-23 RX ADMIN — PHENYLEPHRINE HYDROCHLORIDE 44 MCG/MIN: 10 INJECTION INTRAVENOUS at 08:42

## 2023-03-23 RX ADMIN — FENTANYL CITRATE 100 MCG: 50 INJECTION, SOLUTION INTRAMUSCULAR; INTRAVENOUS at 08:34

## 2023-03-23 RX ADMIN — LIDOCAINE HYDROCHLORIDE 0.5 ML: 10 INJECTION, SOLUTION EPIDURAL; INFILTRATION; INTRACAUDAL; PERINEURAL at 08:10

## 2023-03-23 RX ADMIN — FAMOTIDINE 20 MG: 20 TABLET ORAL at 08:10

## 2023-03-23 RX ADMIN — LIDOCAINE HYDROCHLORIDE 50 MG: 10 INJECTION, SOLUTION EPIDURAL; INFILTRATION; INTRACAUDAL; PERINEURAL at 08:34

## 2023-03-23 RX ADMIN — ONDANSETRON 4 MG: 2 INJECTION INTRAMUSCULAR; INTRAVENOUS at 08:34

## 2023-03-23 RX ADMIN — SUGAMMADEX 200 MG: 100 INJECTION, SOLUTION INTRAVENOUS at 09:26

## 2023-03-23 RX ADMIN — NICARDIPINE HYDROCHLORIDE 10 MG/HR: 25 INJECTION, SOLUTION INTRAVENOUS at 10:25

## 2023-03-23 RX ADMIN — SODIUM CHLORIDE 15 MG/HR: 9 INJECTION, SOLUTION INTRAVENOUS at 09:26

## 2023-03-23 RX ADMIN — PROTAMINE SULFATE 150 MG: 10 INJECTION, SOLUTION INTRAVENOUS at 09:23

## 2023-03-23 NOTE — ANESTHESIA PROCEDURE NOTES
Airway  Urgency: elective    Date/Time: 3/23/2023 8:35 AM  Airway not difficult    General Information and Staff    Patient location during procedure: OR  CRNA/CAA: Naren Bell, CRNA    Indications and Patient Condition  Indications for airway management: airway protection    Preoxygenated: yes  MILS not maintained throughout  Mask difficulty assessment: 1 - vent by mask    Final Airway Details  Final airway type: endotracheal airway      Successful airway: ETT  Cuffed: yes   Successful intubation technique: direct laryngoscopy  Endotracheal tube insertion site: oral  Blade: Jasiel  Blade size: 3  ETT size (mm): 7.0  Cormack-Lehane Classification: grade I - full view of glottis  Placement verified by: chest auscultation and capnometry   Measured from: lips  ETT/EBT  to lips (cm): 21  Number of attempts at approach: 1  Assessment: lips, teeth, and gum same as pre-op and atraumatic intubation    Additional Comments  Negative epigastric sounds, Breath sound equal bilaterally with symmetric chest rise and fall

## 2023-03-23 NOTE — ANESTHESIA PROCEDURE NOTES
Arterial Line    Pre-sedation assessment completed: 3/23/2023 8:05 AM    Patient reassessed immediately prior to procedure    Patient location during procedure: pre-op  Start time: 3/23/2023 8:05 AM  Stop Time:3/23/2023 8:15 AM       Line placed for hemodynamic monitoring.  Performed By   Anesthesiologist: Luis Manuel Kaba MD   Preanesthetic Checklist  Completed: patient identified, IV checked, site marked, risks and benefits discussed, surgical consent, monitors and equipment checked, pre-op evaluation and timeout performed  Arterial Line Prep    Sterile Tech: cap, gloves and sterile barriers  Prep: ChloraPrep  Patient monitoring: blood pressure monitoring, continuous pulse oximetry and EKG  Arterial Line Procedure   Laterality:right  Location:  radial artery  Catheter size: 20 G   Guidance: palpation technique  Number of attempts: 1  Successful placement: yes   Post Assessment   Dressing Type: line sutured, occlusive dressing applied, secured with tape and wrist guard applied.   Complications no  Circ/Move/Sens Assessment: normal and unchanged.   Patient Tolerance: patient tolerated the procedure well with no apparent complications

## 2023-03-23 NOTE — PROGRESS NOTES
INTENSIVIST / PULMONARY FOLLOW UP NOTE     Hospital:  LOS: 0 days   Mr. Jesus Alberto Sue, 67 y.o. male is followed for:     Aortic stenosis, severe    Nonrheumatic aortic valve stenosis            SUBJECTIVE   Jesus Alberto Sue is a 67 y.o. male with PMHx severe AS, frequent PVCs, HLD, GERD, and KYLEIGH who presents to [facility] on 3/23/23 for an elective TAVR with Dr. Kc. Patient has had a known hear murmur and AS was diagnosed in 2021, when it was discovered during cardiac clearance for umbilical hernia repair surgery. Patient is asymptomatic, denying SOA, lower extremity edema and chest pain; however, he has history of frequent PVCs. He was seen in office by Dr. Lutz in February and referred to Dr. Kc for surgical evaluation.      CT angiogram chest on 3/1 demonstrated dense, extensive aortic valve calcification, with ascending aortic ectasia to 4.0 cm and interstitial lung changes representing a degree of pulmonary fibrosis. FLOR on 2/23 with LVEF 45% and bicuspid aortic valve with severe AS. Patient had too much ventricular ectopy to obtain an accurate aortic valve gradient. LHC on 6/17/21 showed non-obstructive CAD. Bilateral carotid duplex on 2/23 unremarkable. PFT pending.    He is seen in the ICU post-procedure.     Time spent: 10 minutes  Electronically signed by ANN MARIE Tuttle, 03/23/23, 8:43 AM EDT.     The patient's relevant past medical, surgical, family, and social history were reviewed    Allergies and medications were reviewed    ROS:    Per subjective, all other systems were reviewed and were negative        OBJECTIVE     Vital Sign Min/Max for last 24 hours:  Temp  Min: 97.2 °F (36.2 °C)  Max: 98.1 °F (36.7 °C)   BP  Min: 98/62  Max: 120/75   Pulse  Min: 81  Max: 107   Resp  Min: 16  Max: 19   SpO2  Min: 93 %  Max: 100 %   No data recorded     Physical Exam:  General Appearance:  No acute distress  Eyes:  No scleral icterus or pallor, pupils normal  Ears, Nose, Mouth, Throat:   Atraumatic, oropharynx clear  Neck:  Trachea midline, thyroid normal  Respiratory:  Clear to auscultation bilaterally, normal effort  Cardiovascular:  Regular rate and rhythm, no murmurs, no peripheral edema  Gastrointestinal:  Soft, non-tender, non-distended, no hepatosplenomegaly  Skin:  Normal temperature, no rash  Psychiatric:  No agitation  Neuro:  No new focal neurologic deficits observed    Telemetry:              Hemodynamics:   CVP:     PAP:     PAOP:     CO:     CI:     SVI:     SVR:       SpO2: 95 % SpO2  Min: 93 %  Max: 100 %   Device:      Flow Rate:   No data recorded     Mechanical Ventilator Settings:                                         Intake/Ouptut 24 hrs (7:00AM - 6:59 AM)  Intake & Output (last 3 days)       03/20 0701 03/21 0700 03/21 0701 03/22 0700 03/22 0701 03/23 0700 03/23 0701  03/24 0700    Urine    2750    Total Output    2750    Net    -2750                  Lines, Drains & Airways     Active LDAs     Name Placement date Placement time Site Days    Peripheral IV 03/23/23 0803 Left;Posterior Hand 03/23/23  0803  Hand  less than 1    Arterial Line 03/23/23 Right Radial 03/23/23  0805  created via procedure documentation  Radial  less than 1                Hematology:  Results from last 7 days   Lab Units 03/23/23  1002 03/22/23  1319   WBC 10*3/mm3 8.02 7.77   HEMOGLOBIN g/dL 11.5* 12.6*   HEMATOCRIT % 33.8* 38.1   PLATELETS 10*3/mm3 202 236     Electrolytes, Magnesium and Phosphorus:  Results from last 7 days   Lab Units 03/23/23  1002 03/22/23  1319   SODIUM mmol/L 133* 132*   CHLORIDE mmol/L 103 96*   POTASSIUM mmol/L 4.3 4.1   CO2 mmol/L 24.0 28.0   MAGNESIUM mg/dL  --  1.7     Renal:  Results from last 7 days   Lab Units 03/23/23  1002 03/22/23  1319   CREATININE mg/dL 0.82 0.95   BUN mg/dL 13 12     Estimated Creatinine Clearance: 90.1 mL/min (by C-G formula based on SCr of 0.82 mg/dL).  Hepatic:  Results from last 7 days   Lab Units 03/22/23  1319   ALK PHOS U/L 81    BILIRUBIN mg/dL 0.3   ALT (SGPT) U/L 16   AST (SGOT) U/L 31     Arterial Blood Gases:        Results from last 7 days   Lab Units 03/22/23  1319   HEMOGLOBIN A1C % 5.10       No results found for: LACTATE    Relevant imaging studies and labs from 03/23/23 were reviewed    Medications (drips):       [START ON 3/24/2023] aspirin, 81 mg, Oral, Daily  atorvastatin, 20 mg, Oral, Daily  losartan, 50 mg, Oral, Daily        •  acetaminophen  •  atropine  •  hydrALAZINE  •  HYDROcodone-acetaminophen  •  labetalol  •  Morphine  •  ondansetron **OR** ondansetron  •  sodium chloride    Assessment & Plan   IMPRESSION / PLAN     Inpatient Problem List:  67 y.o.male:  Active Hospital Problems    Diagnosis    • **Aortic stenosis, severe    • Nonrheumatic aortic valve stenosis         Hospital Course:  67 y.o.male with relevant PMH of severe AS, frequent PVCs, HLD, GERD, and KYLEIGH admitted 3/23/2023 post op TAVR.    Impression:  Stable early post op course    Plan:    Severe AS  Post op TAVR  PVC's  HLD  Per CTS / Cardiology    Abnormal CT  On pre op tavr ct appears to have some degree of mild fibrosis.  Needs o/p pulm follow up.    KYLEIGH  CPAP hs and prn    GERD  PPI    Glycemic control    Monitor lytes, vitals, renal function, hgb    Monitor for complications    Nutrition  Dietary Orders (From admission, onward)     Start     Ordered    03/24/23 0600  DIET MESSAGE For breakfast on 3/24/23 please send english muffin, scrambled eggs, fresh fruit and an apple and blueberries if available  Once        Comments: For breakfast on 3/24/23 please send english muffin, scrambled eggs, fresh fruit and an apple and blueberries if available    03/23/23 1757    03/23/23 1755  DIET MESSAGE Please send baked fish with green peas for dinner tonight  Once        Comments: Please send baked fish with green peas for dinner tonight    03/23/23 1754    03/23/23 0946  Diet: Regular/House Diet; Texture: Regular Texture (IDDSI 7); Fluid Consistency: Thin  (IDDSI 0)  Diet Effective Now        References:    Diet Order [place]   Question Answer Comment   Diets: Regular/House Diet    Texture: Regular Texture (IDDSI 7)    Fluid Consistency: Thin (IDDSI 0)        03/23/23 0910                  Plan of care and goals reviewed with multidisciplinary team at daily rounds           Bolivar Almanzar MD  Intensive Care Medicine  03/23/23 18:12 EDT

## 2023-03-23 NOTE — ANESTHESIA PREPROCEDURE EVALUATION
Anesthesia Evaluation     Patient summary reviewed and Nursing notes reviewed   NPO Solid Status: > 8 hours  NPO Liquid Status: > 8 hours           Airway   Mallampati: II  TM distance: >3 FB  Neck ROM: full  No difficulty expected  Dental - normal exam     Pulmonary     breath sounds clear to auscultation  Cardiovascular   Exercise tolerance: poor (<4 METS)    Rhythm: regular  Rate: normal    (+) murmur,       Neuro/Psych  GI/Hepatic/Renal/Endo      Musculoskeletal     Abdominal    Substance History      OB/GYN          Other                        Anesthesia Plan    ASA 4     general     intravenous induction     Anesthetic plan, risks, benefits, and alternatives have been provided, discussed and informed consent has been obtained with: patient.    a line return to icu post op    CODE STATUS:

## 2023-03-23 NOTE — ANESTHESIA PROCEDURE NOTES
Emergent/Open-Heart Anesthesia FLOR    Procedure Performed: Emergent/Open-Heart Anesthesia FLOR       Start Time:        End Time:   3/23/2023 8:53 AM    Preanesthesia Checklist:  Patient identified, IV assessed, risks and benefits discussed, monitors and equipment assessed, procedure being performed at surgeon's request and anesthesia consent obtained.    General Procedure Information  Diagnostic Indications for Echo:  assessment of ascending aorta, assessment of surgical repair and hemodynamic monitoring  Physician Requesting Echo: Lauri Kc MD  Location performed:  OR  Intubated  Bite block not placed  Heart visualized  Probe Insertion:  Easy  Probe Type:  Multiplane  Modalities:  2D only, color flow mapping and continuous wave Doppler    Echocardiographic and Doppler Measurements    Ventricles    Right Ventricle:  Cavity size normal.  Hypertrophy not present.  Thrombus not present.  Global function normal.    Left Ventricle:  Cavity size dilated.  Diastolic dimension 6.2 cm.  Thrombus not present.  Global Function moderately impaired.  Ejection Fraction 35%.      Ventricular Regional Function:  1- Basal Anteroseptal:  hypokinetic  2- Basal Anterior:  hypokinetic  3- Basal Anterolateral:  hypokinetic  4- Basal Inferolateral:  hypokinetic  5- Basal Inferior:  hypokinetic  6- Basal Inferoseptal:  hypokinetic  7- Mid Anteroseptal:  hypokinetic  8- Mid Anterior:  hypokinetic  9- Mid Anterolateral:  hypokinetic  10- Mid Inferolateral:  hypokinetic  11- Mid Inferior:  hypokinetic  12- Mid Inferoseptal:  hypokinetic  13- Apical Anterior:  hypokinetic  14- Apical Lateral:  hypokinetic  15- Apical Inferior:  hypokinetic  16- Apical Septal:  hypokinetic  17- [place]:  hypokinetic      Valves    Aortic Valve:  Annulus calcified.  Stenosis severe.  Regurgitation moderate.  Leaflets calcified and bicuspid.  Leaflet motions restricted.      Mitral Valve:  Annulus normal.  Stenosis not present.  Regurgitation trace.   Leaflets normal.  Leaflet motions normal.      Tricuspid Valve:  Annulus normal.  Stenosis not present.  Regurgitation absent.  Leaflets normal.  Leaflet motions normal.    Pulmonic Valve:  Annulus normal.  Regurgitation absent.        Aorta    Ascending Aorta:  Size normal.  Dissection not present.  Plaque thickness less than 3 mm.  Mobile plaque not present.    Aortic Arch:  Size normal.  Dissection not present.  Plaque thickness less than 3 mm.  Mobile plaque not present.    Descending Aorta:  Size normal.  Dissection not present.  Plaque thickness less than 3 mm.  Mobile plaque not present.        Atria    Right Atrium:  Size normal.  Thrombus not present.  Tumor not present.  Device not present.      Left Atrium:  Size normal.  Spontaneous echo contrast present.  Thrombus not present.  Tumor not present.  Device not present.    Left atrial appendage normal.      Septa        Ventricular Septum:  Intra-ventricular septum morphology normal.          Other Findings  Pericardium:  normal  Pleural Effusion:  none      Anesthesia Information      Echocardiogram Comments:       Bicuspid aortic valve  With AS  Mean gradient of 22 mmhg measured but doppler angle was not parall7 cm22el  MASSIEL measured o.7 cm2  Lv dilated and global lv dysfunction   Smoke noted in LA  Trace mr  Post deployment stented  bioprostetic valve in aortic position  Mean gradient 2 mmhg  Small paravalvular leak visible   Lvf unchanged  No evidence of pericardial effusion

## 2023-03-23 NOTE — OP NOTE
Operative Report    Preop Diagnosis: Symptomatic aortic valvular stenosis    Results of STS risk score discussed with patient and family    Postoperative Diagnosis: Same      Procedure: Transcatheter aortic valve replacement with a Mcgregor DEMAR 3 valve size 29 with 2 additional milliliters inflation.  Rapid ventricular pacing.  Multiple thoracic aortograms.  Femoral arteriogram.  Balloon valvuloplasty.        Surgeons: Lauri Kc MD      Assistant: Yonathan knott PA-C    Cardiologist; Dr. Lutz and Dr. Hernandez    The Assistant provided services of suctioning, irrigation and retraction.  Also, assisted in suture closure of parts of the skin incision.      Indication: This patient was referred to the transcatheter valve team for symptomatic aortic valvular stenosis.  He understood that surgery carries with it a risk of stroke bleeding infection death and limb loss.  Also the possible need for permanent pacemaker.  We discussed the various options of surgical valve open replacement versus transcatheter replacement and a shared decision-making process.  This was discussed between me and the patient and also between cardiology and the patient and cardiology and myself.  The patient agreed to proceed with transcatheter valve and this was his preference.        Description: Supine position sterile prep and drape.  Antibiotics given general endotracheal anesthesia.  The right femoral artery and vein were percutaneously cannulated for aortography and rapid ventricular pacing respectively.  This portion of the procedure will be dictated by cardiology services.  I percutaneously cannulated the left common femoral artery and deployed 2 Perclose devices in the left common femoral artery as heparin was given.  A size 16 Guatemalan sheath was placed over an 035 guidewire into the left common femoral artery using fluoroscopic visualization.  Aortic balloon valvuloplasty was then performed after crossing the aortic valve with  a straight wire and changing this over to a safari wire via a guide catheter.  The balloon valvuloplasty was performed with a 20 mm balloon during rapid ventricular pacing.  At this point an Mcgregor DEMAR 3 valve size 29 was positioned across the annulus and during a period of aortography with rapid ventricular pacing this was inflated nominally.  Subsequent transesophageal echo demonstrated a small paravalvular leak and 2 additional milliliters of saline were placed in the balloon for an additional inflation.  Following this there was no significant paravalvular leak.  Aortography did not demonstrate any significant paravalvular leak.  I then removed the 16 Moldovan sheath from the left common femoral artery and secured to the 2 Perclose devices.  Left femoral arteriogram via the right femoral pigtail catheter demonstrated minimal extravasation of flow and no compromise of flow distally.  Manual pressure was then applied to the left common femoral and protamine was given.  There was no apparent early complications and estimated blood loss less than 100 mL.  Total fluoroscopy time 9 minutes and 30 seconds total contrast given 80 mL.  The patient was extubated in the operating room with no apparent neurologic deficits      Please note that portions of this note were completed with a voice recognition program. Efforts were made to edit the dictations, but occasionally words are mistranscribed.

## 2023-03-23 NOTE — OP NOTE
PREPROCEDURE DIAGNOSIS: Aortic stenosis    POSTPROCEDURE DIAGNOSIS: Aortic stenosis    PROCEDURE(S):   1. 6F femoral arterial sheath placement.  2. 8 F venous sheath placement  3. Temporary transvenous pacemaker insertion  4. Catheter placement in the aortic root  5. Multiple intraprocedural aortograms  6. Transcatheter aortic valve replacement using a 20  7. +4 mm DEMAR 3 pericardial prosthesis    INDICATIONS:   1. Critical aortic stenosis.    INTERVENTIONAL CARDIOLOGISTS:  Rosa Lutz MD.  Suma Hernandez MD.    CARDIAC SURGEONS:   Lauri Kc MD    DESCRIPTION OF PROCEDURE:   After informed consent, the patient was brought to the OR in a fasting condition. The patient was prepped and draped from level of chin to  knees by standard surgical technique.  Right femoral arterial access was obtained by percutaneous anterior wall puncture technique and an 6-Indonesian arterial sheath was placed.  Venous access was obtained in similar fashion and a 8-Indonesian venous sheath was placed.     A temporary transvenous pacing electrode was inserted via the left femoral venous access and advanced to the right ventricular apex and excellent pacing/sensing was noted. A 5F pigtail catheter was advanced from the femoral arterial access and this was advanced to the aortic root, and intraprocedural aortography was performed to confirm the implantation angle.    At this time, left femoral arterial access was obtained by Dr. Kc and an Mcgregor sheath was placed (see separate note). Using this access, AL1 catheter, and straight wire, the aortic valve was crossed. The catheter was advanced into the left ventricle and the wire was exchanged for a Safari wire.  Balloon valvuloplasty was then performed using a 25 x 40 mm balloon.  Subsequently, a 29+2 mm tissue Mcgregor DEMAR 3 valve was advanced using standard delivery system. This was positioned under fluoroscopy. After the satisfactory position was confirmed, the valve was  expanded under rapid pacing protocol. Satisfactory position was noted. Post implant images revealed mild aortic insufficiency which was paravalvular.  2 additional mL of contrast were added and post dilation was performed.  Only a trivial paravalvular leak was seen.  No significant central regurgitation was noted.     At this time, the delivery system was removed. The arterial sheath was removed and the arteriotomy was closed by the surgeons (see separate note). The patient tolerated the procedure well and without complications.  Estimated blood loss < 100 cc    FINAL IMPRESSION:   · Successful transcatheter aortic valve replacement with 29+4 mm  Mcgregor DEMAR 3 pericardial prosthesis.   · No acute procedure-related complications.     Rosa Lutz MD, FACC

## 2023-03-23 NOTE — ANESTHESIA PROCEDURE NOTES
Emergent/Open-Heart Anesthesia FLOR    Procedure Performed: Emergent/Open-Heart Anesthesia FLOR       Start Time:        End Time:

## 2023-03-23 NOTE — PROGRESS NOTES
Structural Heart Team Meeting Summary         Patient Name: Jesus Alberto Sue  YOB: 1955     Referring Physician: Dr. Dial  Admission Status: Inpatient     Attendees: Daren Almazan MD, Lauri Kc MD, Rosa Lutz MD, Suma Hernandez MD, Balbir Clinical Specialist, Cedric Medrano, Abelardo Rankin MD and ANN MARIE Hernandez  Primary presentation of AS: N/A   Heart Failure:   HFpEF   NYHA Functional Class: II   LVEF:  55%   ANNULUS Measurement: 3.6cm    Major Organ Compromise: Pulmonary (FEV1 < 50%)    Procedure Specific Impediment: N/A    Other Factors:   Major Nutritional Deficit: No  Cognitive Impairment: No  Oxygen dependent: No    STS Risk Score:   Mortality Risk: 0.9%  Mortality and Morbidity Risk: 6.5%    TAVR/TMVR Rationale: Elderly male with severe symptomatic AS and low STS risk risk score for TAVR. Structural heart team in agreement to proceed with TAVR         Diagnostic Studies Discussed/ Reviewed: EKG-NSR with PVCs, carotid duplex, FLOR-moderate AI & bicuspid valve, CXR-suspected PNA, CT-celiac axis stenosis, potential pulmonary fibrosis, inflammation and 2cm mediastinal lymph nodes, PMH: latent TB, sodium 132-otherwise labs unremarkable.     Procedure planning details:   Valve Size: 29  Cardiology sheath access: Right femoral  CT Surgery sheath access: Left femoral    Post Procedure Considerations: Bleeding at groin sites, monitor for arrhythmias and s/s of CVA, monitor electrolytes and respiratory status post procedure-encourage pulmonary toilet.

## 2023-03-23 NOTE — INTERVAL H&P NOTE
Pre-Op H&P (See Recent Office Note Attached for Full H&P)    History and physical note from office reviewed and updated with the following, otherwise there are no changes in H&P:      Review of Systems:  General ROS:  no fever, chills, rashes.  No change since last office visit.  No recent sick exposure  Cardiovascular ROS: no chest pain or dyspnea on exertion  Respiratory ROS: no cough, shortness of breath, or wheezing    Immunization History   Administered Date(s) Administered   • COVID-19 (JOLENE) 03/09/2021   • Fluad Quad 65+ 09/23/2022   • Fluzone High-Dose 65+yrs 10/09/2020   • Pneumococcal Conjugate 20-Valent (PCV20) 01/09/2023         Meds:    No current facility-administered medications on file prior to encounter.     Current Outpatient Medications on File Prior to Encounter   Medication Sig Dispense Refill   • ascorbic acid (VITAMIN C) 1000 MG tablet Take 1 tablet by mouth Daily.     • atorvastatin (LIPITOR) 20 MG tablet Take 1 tablet by mouth Daily.     • Calcium-Phosphorus-Vitamin D (CITRACAL +D3 PO) Take 1 tablet by mouth Daily.     • Flaxseed, Linseed, (Flaxseed Oil) 1400 MG capsule Take 2 tablets by mouth Daily.     • losartan (COZAAR) 50 MG tablet Take 1 tablet by mouth Daily.     • Turmeric 500 MG capsule Take 2 capsules by mouth Daily.     • loratadine (CLARITIN) 10 MG tablet Take 1 tablet by mouth Daily As Needed for Allergies.     • triamcinolone (KENALOG) 0.1 % cream Apply 1 application topically to the appropriate area as directed 2 (Two) Times a Day As Needed for Rash.         Vital Signs:  DR=283/81  HR=87   SpO2=97% room air   Temp=97.2      Physical Exam:    CV:  S1S2 regular rate and rhythm, + murmur               Resp:  Clear to auscultation; respirations regular, even and unlabored    Results Review:     Lab Results   Component Value Date    WBC 7.77 03/22/2023    HGB 12.6 (L) 03/22/2023    HCT 38.1 03/22/2023    MCV 92.5 03/22/2023     03/22/2023    NEUTROABS 5.16 03/22/2023     GLUCOSE 95 03/22/2023    BUN 12 03/22/2023    CREATININE 0.95 03/22/2023    EGFRIFNONA >60 06/16/2021    EGFRIFAFRI >60 06/16/2021     (L) 03/22/2023    K 4.1 03/22/2023    CL 96 (L) 03/22/2023    CO2 28.0 03/22/2023    MG 1.7 03/22/2023    CALCIUM 9.2 03/22/2023    ALBUMIN 4.0 03/22/2023    AST 31 03/22/2023    ALT 16 03/22/2023    BILITOT 0.3 03/22/2023    PTT 33.2 03/22/2023   I reviewed the patient's new clinical results.    Cancer Staging (if applicable)  Cancer Patient: __ yes __no __unknown; If yes, clinical stage T:__ N:__M:__, stage group or __N/A    Assessment/Plan:  SEVERE AORTIC VALVULAR STENOSIS /  TRANSCATHETER AORTIC VALVE REPLACEMENT        Lauri cK agree the above.  I have seen the patient this morning in the preoperative area but I have also seen him in the office previously.  We have discussed the various risks and benefits of open surgical valve replacement versus transcatheter valve replacement.  In a shared decision-making process.  This is also been discussed between the patient and his cardiologist and between the cardiologist and myself.  The patient agrees and very much prefers the transcatheter valve replacement.  He is aware that this procedure carries with it a risk of stroke bleeding infection and death and the possible need for permanent pacemaker.  No guarantees been made as to outcome      MATHEUS Leiva   3/23/2023   08:27 EDT

## 2023-03-23 NOTE — ANESTHESIA POSTPROCEDURE EVALUATION
Patient: Jesus Alberto Sue    Procedure Summary     Date: 03/23/23 Room / Location: [facility] OR 01 / [facility]    Anesthesia Start: 0831 Anesthesia Stop: 0945    Procedures:       TRANSCATHETER AORTIC VALVE REPLACEMENT (Chest)      Transfemoral Transcatheter Aortic Valve Replacement Diagnosis:       Aortic stenosis, severe      (Aortic stenosis, severe [I35.0])    Surgeons: Lauri Kc MD; Rosa Lutz MD Provider: Luis Manuel Kaab MD    Anesthesia Type: general ASA Status: 4          Anesthesia Type: general    Vitals  Vitals Value Taken Time   /60 03/23/23 0945   Temp     Pulse 88 03/23/23 0944   Resp     SpO2 100 % 03/23/23 0945   Vitals shown include unvalidated device data.        Post Anesthesia Care and Evaluation    Patient location during evaluation: ICU  Patient participation: complete - patient participated  Level of consciousness: awake and alert  Pain score: 0  Pain management: adequate    Airway patency: patent  Anesthetic complications: No anesthetic complications  PONV Status: none  Cardiovascular status: hemodynamically stable and acceptable  Respiratory status: nonlabored ventilation, acceptable, nasal cannula and spontaneous ventilation  Hydration status: acceptable    Comments: Handoff given to ICU RN at bedside.

## 2023-03-24 ENCOUNTER — APPOINTMENT (OUTPATIENT)
Dept: GENERAL RADIOLOGY | Facility: HOSPITAL | Age: 68
DRG: 267 | End: 2023-03-24
Payer: MEDICARE

## 2023-03-24 ENCOUNTER — APPOINTMENT (OUTPATIENT)
Dept: CARDIOLOGY | Facility: HOSPITAL | Age: 68
DRG: 267 | End: 2023-03-24
Payer: MEDICARE

## 2023-03-24 VITALS
BODY MASS INDEX: 28.09 KG/M2 | SYSTOLIC BLOOD PRESSURE: 118 MMHG | HEIGHT: 67 IN | RESPIRATION RATE: 18 BRPM | WEIGHT: 179 LBS | OXYGEN SATURATION: 98 % | HEART RATE: 73 BPM | DIASTOLIC BLOOD PRESSURE: 79 MMHG | TEMPERATURE: 97.3 F

## 2023-03-24 LAB
ACT BLD: 137 SECONDS (ref 82–152)
ACT BLD: 323 SECONDS (ref 82–152)
ACT BLD: 329 SECONDS (ref 82–152)
ALBUMIN SERPL-MCNC: 3.4 G/DL (ref 3.5–5.2)
ALBUMIN/GLOB SERPL: 1.1 G/DL
ALP SERPL-CCNC: 70 U/L (ref 39–117)
ALT SERPL W P-5'-P-CCNC: 12 U/L (ref 1–41)
ANION GAP SERPL CALCULATED.3IONS-SCNC: 9 MMOL/L (ref 5–15)
AORTIC ARCH: 4 CM
ASCENDING AORTA: 4.2 CM
AST SERPL-CCNC: 19 U/L (ref 1–40)
BASOPHILS # BLD AUTO: 0.02 10*3/MM3 (ref 0–0.2)
BASOPHILS NFR BLD AUTO: 0.2 % (ref 0–1.5)
BH CV ECHO MEAS - AI P1/2T: 318.6 MSEC
BH CV ECHO MEAS - AO MAX PG: 16.9 MMHG
BH CV ECHO MEAS - AO MEAN PG: 9 MMHG
BH CV ECHO MEAS - AO ROOT DIAM: 4.2 CM
BH CV ECHO MEAS - AO V2 MAX: 205 CM/SEC
BH CV ECHO MEAS - AO V2 VTI: 38.2 CM
BH CV ECHO MEAS - AVA(I,D): 1.39 CM2
BH CV ECHO MEAS - EDV(CUBED): 205.4 ML
BH CV ECHO MEAS - EDV(MOD-SP2): 254 ML
BH CV ECHO MEAS - EDV(MOD-SP4): 229 ML
BH CV ECHO MEAS - EF(MOD-BP): 44.3 %
BH CV ECHO MEAS - EF(MOD-SP2): 50.4 %
BH CV ECHO MEAS - EF(MOD-SP4): 42.4 %
BH CV ECHO MEAS - ESV(CUBED): 68.9 ML
BH CV ECHO MEAS - ESV(MOD-SP2): 126 ML
BH CV ECHO MEAS - ESV(MOD-SP4): 132 ML
BH CV ECHO MEAS - FS: 30.5 %
BH CV ECHO MEAS - IVS/LVPW: 0.82 CM
BH CV ECHO MEAS - IVSD: 0.9 CM
BH CV ECHO MEAS - LA DIMENSION: 4.4 CM
BH CV ECHO MEAS - LV MASS(C)D: 239.9 GRAMS
BH CV ECHO MEAS - LV MAX PG: 2.02 MMHG
BH CV ECHO MEAS - LV MEAN PG: 1 MMHG
BH CV ECHO MEAS - LV V1 MAX: 71.1 CM/SEC
BH CV ECHO MEAS - LV V1 VTI: 15.9 CM
BH CV ECHO MEAS - LVIDD: 5.9 CM
BH CV ECHO MEAS - LVIDS: 4.1 CM
BH CV ECHO MEAS - LVOT AREA: 3.5 CM2
BH CV ECHO MEAS - LVOT DIAM: 2.1 CM
BH CV ECHO MEAS - LVPWD: 1 CM
BH CV ECHO MEAS - SV(LVOT): 54.9 ML
BH CV ECHO MEAS - SV(MOD-SP2): 128 ML
BH CV ECHO MEAS - SV(MOD-SP4): 97 ML
BH CV VAS BP LEFT ARM: NORMAL MMHG
BILIRUB SERPL-MCNC: 0.2 MG/DL (ref 0–1.2)
BUN SERPL-MCNC: 15 MG/DL (ref 8–23)
BUN/CREAT SERPL: 17.6 (ref 7–25)
CALCIUM SPEC-SCNC: 8.4 MG/DL (ref 8.6–10.5)
CHLORIDE SERPL-SCNC: 102 MMOL/L (ref 98–107)
CO2 SERPL-SCNC: 22 MMOL/L (ref 22–29)
CREAT SERPL-MCNC: 0.85 MG/DL (ref 0.76–1.27)
DEPRECATED RDW RBC AUTO: 43.6 FL (ref 37–54)
EGFRCR SERPLBLD CKD-EPI 2021: 95.2 ML/MIN/1.73
EOSINOPHIL # BLD AUTO: 0 10*3/MM3 (ref 0–0.4)
EOSINOPHIL NFR BLD AUTO: 0 % (ref 0.3–6.2)
ERYTHROCYTE [DISTWIDTH] IN BLOOD BY AUTOMATED COUNT: 12.9 % (ref 12.3–15.4)
GLOBULIN UR ELPH-MCNC: 3.1 GM/DL
GLUCOSE SERPL-MCNC: 176 MG/DL (ref 65–99)
HCT VFR BLD AUTO: 32.7 % (ref 37.5–51)
HGB BLD-MCNC: 10.8 G/DL (ref 13–17.7)
IMM GRANULOCYTES # BLD AUTO: 0.07 10*3/MM3 (ref 0–0.05)
IMM GRANULOCYTES NFR BLD AUTO: 0.6 % (ref 0–0.5)
LV EF 2D ECHO EST: 45 %
LYMPHOCYTES # BLD AUTO: 0.77 10*3/MM3 (ref 0.7–3.1)
LYMPHOCYTES NFR BLD AUTO: 6.3 % (ref 19.6–45.3)
MAGNESIUM SERPL-MCNC: 1.8 MG/DL (ref 1.6–2.4)
MAXIMAL PREDICTED HEART RATE: 153 BPM
MCH RBC QN AUTO: 30.5 PG (ref 26.6–33)
MCHC RBC AUTO-ENTMCNC: 33 G/DL (ref 31.5–35.7)
MCV RBC AUTO: 92.4 FL (ref 79–97)
MONOCYTES # BLD AUTO: 1.01 10*3/MM3 (ref 0.1–0.9)
MONOCYTES NFR BLD AUTO: 8.2 % (ref 5–12)
NEUTROPHILS NFR BLD AUTO: 10.43 10*3/MM3 (ref 1.7–7)
NEUTROPHILS NFR BLD AUTO: 84.7 % (ref 42.7–76)
NRBC BLD AUTO-RTO: 0 /100 WBC (ref 0–0.2)
PHOSPHATE SERPL-MCNC: 2.9 MG/DL (ref 2.5–4.5)
PLATELET # BLD AUTO: 198 10*3/MM3 (ref 140–450)
PMV BLD AUTO: 9.5 FL (ref 6–12)
POTASSIUM SERPL-SCNC: 4.5 MMOL/L (ref 3.5–5.2)
PROT SERPL-MCNC: 6.5 G/DL (ref 6–8.5)
RBC # BLD AUTO: 3.54 10*6/MM3 (ref 4.14–5.8)
SODIUM SERPL-SCNC: 133 MMOL/L (ref 136–145)
STRESS TARGET HR: 130 BPM
WBC NRBC COR # BLD: 12.3 10*3/MM3 (ref 3.4–10.8)

## 2023-03-24 PROCEDURE — 71045 X-RAY EXAM CHEST 1 VIEW: CPT

## 2023-03-24 PROCEDURE — 93321 DOPPLER ECHO F-UP/LMTD STD: CPT

## 2023-03-24 PROCEDURE — 83735 ASSAY OF MAGNESIUM: CPT | Performed by: INTERNAL MEDICINE

## 2023-03-24 PROCEDURE — 80053 COMPREHEN METABOLIC PANEL: CPT | Performed by: INTERNAL MEDICINE

## 2023-03-24 PROCEDURE — 84100 ASSAY OF PHOSPHORUS: CPT | Performed by: INTERNAL MEDICINE

## 2023-03-24 PROCEDURE — 93321 DOPPLER ECHO F-UP/LMTD STD: CPT | Performed by: INTERNAL MEDICINE

## 2023-03-24 PROCEDURE — 93308 TTE F-UP OR LMTD: CPT | Performed by: INTERNAL MEDICINE

## 2023-03-24 PROCEDURE — 85025 COMPLETE CBC W/AUTO DIFF WBC: CPT | Performed by: INTERNAL MEDICINE

## 2023-03-24 PROCEDURE — 93325 DOPPLER ECHO COLOR FLOW MAPG: CPT

## 2023-03-24 PROCEDURE — 0 MAGNESIUM SULFATE 4 GM/100ML SOLUTION: Performed by: THORACIC SURGERY (CARDIOTHORACIC VASCULAR SURGERY)

## 2023-03-24 PROCEDURE — 93308 TTE F-UP OR LMTD: CPT

## 2023-03-24 PROCEDURE — 93325 DOPPLER ECHO COLOR FLOW MAPG: CPT | Performed by: INTERNAL MEDICINE

## 2023-03-24 PROCEDURE — 99232 SBSQ HOSP IP/OBS MODERATE 35: CPT | Performed by: INTERNAL MEDICINE

## 2023-03-24 PROCEDURE — 99232 SBSQ HOSP IP/OBS MODERATE 35: CPT | Performed by: THORACIC SURGERY (CARDIOTHORACIC VASCULAR SURGERY)

## 2023-03-24 RX ORDER — MAGNESIUM SULFATE HEPTAHYDRATE 40 MG/ML
4 INJECTION, SOLUTION INTRAVENOUS ONCE
Status: DISCONTINUED | OUTPATIENT
Start: 2023-03-24 | End: 2023-03-24

## 2023-03-24 RX ORDER — ASPIRIN 81 MG/1
81 TABLET ORAL DAILY
Qty: 30 TABLET | Refills: 11 | Status: SHIPPED | OUTPATIENT
Start: 2023-03-24

## 2023-03-24 RX ADMIN — Medication 81 MG: at 08:13

## 2023-03-24 RX ADMIN — LOSARTAN POTASSIUM 50 MG: 50 TABLET, FILM COATED ORAL at 08:13

## 2023-03-24 RX ADMIN — MAGNESIUM SULFATE HEPTAHYDRATE 4 G: 40 INJECTION, SOLUTION INTRAVENOUS at 05:15

## 2023-03-24 RX ADMIN — ATORVASTATIN CALCIUM 20 MG: 20 TABLET, FILM COATED ORAL at 08:18

## 2023-03-24 RX ADMIN — Medication 400 MG: at 08:13

## 2023-03-24 NOTE — PLAN OF CARE
Goal Outcome Evaluation:    S/p TAVR, Cardene weaned off.  NSR with PVC's and PAC's.  On room air.  Sheaths removed without complications.  Left groin puncture site developed a hematoma while ambulating this evening at 1815.  Manual pressure applied for 15 minutes, site is ecchymotic and soft.  Will continue to monitor site closely.

## 2023-03-24 NOTE — PROGRESS NOTES
[facility] Cardiology Daily Note       LOS: 1 day   Patient Care Team:  Sameer Desai MD as PCP - General (Family Medicine)  Karen Dial MD as Consulting Physician (Cardiology)  Rosa Lutz MD as Consulting Physician (Cardiology)    Chief Complaint: Postop day 1 status post TAVR    Subjective     Subjective: 89% on room air.  Blood pressures have been good overnight.  Heart rates have been good.    Review of Systems:   As above.    Medications:  aspirin, 81 mg, Oral, Daily  atorvastatin, 20 mg, Oral, Daily  losartan, 50 mg, Oral, Daily  magnesium sulfate, 4 g, Intravenous, Once  niCARdipine, , ,         Objective     Vital Sign Min/Max for last 24 hours  Temp  Min: 97.2 °F (36.2 °C)  Max: 98.6 °F (37 °C)   BP  Min: 88/57  Max: 125/69   Pulse  Min: 72  Max: 107   Resp  Min: 16  Max: 20   SpO2  Min: 89 %  Max: 100 %   Flow (L/min)  Min: 2  Max: 2   No data recorded      Intake/Output Summary (Last 24 hours) at 3/24/2023 0732  Last data filed at 3/24/2023 0600  Gross per 24 hour   Intake 650 ml   Output 3350 ml   Net -2700 ml            Physical Exam:    General: Alert and oriented.   Cardiovascular: Heart has a nondisplaced focal PMI. Regular rate and rhythm without murmur, gallop or rub.  Lungs: Clear without rales or wheezes. Equal expansion is noted.   Abdomen: Soft, nontender.  Extremities: Show no edema.   Skin: warm and dry.     Results Review:    I reviewed the patient's new clinical results.  EKG:  Tele:    Labs:    Results from last 7 days   Lab Units 03/24/23  0318 03/23/23  1002 03/22/23  1319   SODIUM mmol/L 133* 133* 132*   POTASSIUM mmol/L 4.5 4.3 4.1   CHLORIDE mmol/L 102 103 96*   CO2 mmol/L 22.0 24.0 28.0   BUN mg/dL 15 13 12   CREATININE mg/dL 0.85 0.82 0.95   CALCIUM mg/dL 8.4* 7.9* 9.2   BILIRUBIN mg/dL 0.2  --  0.3   ALK PHOS U/L 70  --  81   ALT (SGPT) U/L 12  --  16   AST (SGOT) U/L 19  --  31   GLUCOSE mg/dL 176* 143* 95     Results from last 7 days    Lab Units 03/24/23  0318 03/23/23  1002 03/22/23  1319   WBC 10*3/mm3 12.30* 8.02 7.77   HEMOGLOBIN g/dL 10.8* 11.5* 12.6*   HEMATOCRIT % 32.7* 33.8* 38.1   PLATELETS 10*3/mm3 198 202 236     No results found for: TROPONINI, TROPONINT  No results found for: CHOL  No results found for: TRIG  No results found for: HDL  No components found for: LDLCALC  Lab Results   Component Value Date    INR 1.01 03/22/2023    PROTIME 13.2 03/22/2023         Ejection Fraction:    Assessment   Assessment:    1. Nonrheumatic aortic stenosis  a. Mild nonobstructive aortic stenosis with mean gradient 18 mmHg noted on cath 6/2021  b. Echocardiogram, 12/19/2022; EF 50-55%. Mild concentric LVH. LA is mildly dilated. RV mildly enlarged. Severe aortic stenosis with peak/mean pressure gradient of 72.85 mmHg/52.10 mmHg, the AV area by continuity is 0.8 cm2. Mild AR. Mild MR. Diastolic function is abnormal with a septal E = 0.07 m/s. Mild TR. Mild PH. RVSP 42.63 mmHg. 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 There is an aneurysm of the ascending aorta measuring up to 3.9 cm.   c. Status post 29 mm DEMAR 3 pericardial prosthesis on 3/23/2023  2. Nonobstructive coronary artery disease  1. Cardiac catheterization, 6/17/2021: Nonobstructive CAD.  Mild aortic stenosis with mean gradient 18 mmHg  3. PVC's  a. Noted on EKG 2/1/2023  4. KYLEIGH, intolerant to CPAP  5. GERD      Plan:    Home on usual home medications with the addition of aspirin 81 mg daily.  Magnesium is 1.8.  I will give 400 mg of magnesium oxide prior to discharge.  Echocardiogram prior to discharge  Follow-up with Dr. Dial in 4 to 6 weeks.    Rosa Lutz MD  03/24/23  07:32 EDT

## 2023-03-24 NOTE — CASE MANAGEMENT/SOCIAL WORK
Case Management Discharge Note      Final Note: I spoke with Mr Sue over the phone as he had already been discharged.  He denied any needs, and stated that he was waiting to hear back from Dr. Kc's office for a follow up appointment.  At this time, I am unable to confirm any appointment has been made.         Selected Continued Care - Discharged on 3/24/2023 Admission date: 3/23/2023 - Discharge disposition: Home or Self Care    Destination    No services have been selected for the patient.              Durable Medical Equipment    No services have been selected for the patient.              Dialysis/Infusion    No services have been selected for the patient.              Home Medical Care    No services have been selected for the patient.              Therapy    No services have been selected for the patient.              Community Resources    No services have been selected for the patient.              Community & DME    No services have been selected for the patient.                       Final Discharge Disposition Code: 01 - home or self-care

## 2023-03-24 NOTE — PROGRESS NOTES
CTS Progress Note       LOS: 1 day   Patient Care Team:  Sameer Desai MD as PCP - General (Family Medicine)  Karen Dial MD as Consulting Physician (Cardiology)  Rosa Lutz MD as Consulting Physician (Cardiology)    Chief Complaint: Aortic stenosis, severe    Vital Signs:  Temp:  [97.2 °F (36.2 °C)-98.6 °F (37 °C)] 98.6 °F (37 °C)  Heart Rate:  [] 80  Resp:  [16-20] 16  BP: ()/(51-92) 124/92    Physical Exam: Breathing unlabored on room air groin sites are satisfactory ambulatory       Results:   Results from last 7 days   Lab Units 03/24/23  0318   WBC 10*3/mm3 12.30*   HEMOGLOBIN g/dL 10.8*   HEMATOCRIT % 32.7*   PLATELETS 10*3/mm3 198     Results from last 7 days   Lab Units 03/24/23  0318   SODIUM mmol/L 133*   POTASSIUM mmol/L 4.5   CHLORIDE mmol/L 102   CO2 mmol/L 22.0   BUN mg/dL 15   CREATININE mg/dL 0.85   GLUCOSE mg/dL 176*   CALCIUM mg/dL 8.4*           Imaging Results (Last 24 Hours)     Procedure Component Value Units Date/Time    XR Chest 1 View [[id number]] Resulted: 03/24/23 0333     Updated: 03/24/23 0334          Assessment      Severe non-rheumatic Aortic stenosis and LVEF 45%    Frequent PVC's    S/P TAVR using bioprosthesis 3/23/2023    Hypertension    High cholesterol    KYLEIGH on CPAP        Plan   Discharge home today if satisfactory with cardiology    Please note that portions of this note were completed with a voice recognition program. Efforts were made to edit the dictations, but occasionally words are mistranscribed.    Lauri Kc MD  03/24/23  06:37 EDT

## 2023-03-24 NOTE — NURSING NOTE
Pt. Referred for Phase II Cardiac Rehab. Staff discussed benefits of exercise, program protocol, and educational material provided. Teach back verified.  Permission granted from patient for staff to fax referral information to outlying program at this time.  Staff faxed referral info to  [facility].

## 2023-03-24 NOTE — PLAN OF CARE
Goal Outcome Evaluation:    POD 1 TAVR:    Patient AOX4/4, VSS on room air and no drips. Patient groin access is soft and non tender; good distal pulses. UO adequate. Awaiting AM labs.

## 2023-03-25 LAB
BH BB BLOOD EXPIRATION DATE: NORMAL
BH BB BLOOD EXPIRATION DATE: NORMAL
BH BB BLOOD TYPE BARCODE: 5100
BH BB BLOOD TYPE BARCODE: 5100
BH BB DISPENSE STATUS: NORMAL
BH BB DISPENSE STATUS: NORMAL
BH BB PRODUCT CODE: NORMAL
BH BB PRODUCT CODE: NORMAL
BH BB UNIT NUMBER: NORMAL
BH BB UNIT NUMBER: NORMAL
CROSSMATCH INTERPRETATION: NORMAL
CROSSMATCH INTERPRETATION: NORMAL
UNIT  ABO: NORMAL
UNIT  ABO: NORMAL
UNIT  RH: NORMAL
UNIT  RH: NORMAL

## 2023-03-27 LAB
BASE EXCESS BLDA CALC-SCNC: 2 MMOL/L (ref -5–5)
CA-I BLDA-SCNC: 1.21 MMOL/L (ref 1.2–1.32)
CO2 BLDA-SCNC: 29 MMOL/L (ref 24–29)
GLUCOSE BLDC GLUCOMTR-MCNC: 128 MG/DL (ref 70–130)
HCO3 BLDA-SCNC: 27.2 MMOL/L (ref 22–26)
HCT VFR BLDA CALC: 34 % (ref 38–51)
HGB BLDA-MCNC: 11.6 G/DL (ref 12–17)
PCO2 BLDA: 47 MM HG (ref 35–45)
PH BLDA: 7.37 PH UNITS (ref 7.35–7.6)
PO2 BLDA: 437 MMHG (ref 80–105)
POTASSIUM BLDA-SCNC: 3.6 MMOL/L (ref 3.5–4.9)
SAO2 % BLDA: 100 % (ref 95–98)
SODIUM BLD-SCNC: 136 MMOL/L (ref 138–146)

## 2023-03-30 ENCOUNTER — TELEPHONE (OUTPATIENT)
Dept: CARDIOLOGY | Facility: HOSPITAL | Age: 68
End: 2023-03-30
Payer: MEDICARE

## 2023-03-30 NOTE — TELEPHONE ENCOUNTER
Called patient 1 week after TAVR. He is feeling very well, he denies chest discomfort, dizziness, SOA and BLE edema. Has apt with PCP tomorrow, requested that I send over a copy of his most recent CXR. Faxed to . Has been in contact with local Cardiac Rehab, awaiting call back to schedule orientation. Apt with CTS on 4/17, echo on 4/28 and local cardiology on 5/5. No questions or concerns today, he has my contact information for future needs.

## 2023-03-30 NOTE — DISCHARGE SUMMARY
Discharge Summary Transcatheter Valve Replacement    Date of Admission: 3/23/2023  Date of Discharge: 3/24/2023    PCP: Sameer Desai MD  ATTENDING: Lauri Kc MD    Consults:   Consulting Physician(s)             None            Structural Heart Team  1.  Yair Mendez MD  2.  Daren Almazan MD  3.  Lauri Kc MD  4.  Nico Jackman MD  5..  Suma Hernandez MD  6.  Rosa Lutz MD  7.  Tomas Hdez MD  8.  ANN MARIE Hernandez    Presenting Problem/ HPI: The patient is a 67-year-old male who was referred for transcatheter aortic valve evaluation.  He has already undergone much of the work-up process.  He has had a known heart murmur for many years and had undergone cardiac catheterization at the [facility] fairly recently with no evidence of obstructive coronary disease.  He has also undergone his transesophageal echo and precordial echo and carotid duplex scan.  I reviewed the data from those and his peak gradient across his valve is 72 mmHg with a valve area of 0.8 cm².    On CT scan it appears that his access vessels are satisfactory for catheter delivery.     Discharge Diagnosis:     Severe non-rheumatic Aortic stenosis and LVEF 45%    Frequent PVC's    S/P TAVR using bioprosthesis 3/23/2023    Hypertension    High cholesterol    KYLEIGH on CPAP      Procedures Performed:   Procedure(s):  TRANSCATHETER AORTIC VALVE REPLACEMENT  Transfemoral Transcatheter Aortic Valve Replacement    Hospital Course:The patient is an 67 y.o. male from with symptoms of severe aortic stenosis as noted above in the HPI.  An echocardiogram revealed the following:   Left ventricular systolic function is low normal. Estimated left ventricular EF = 45%  •  Bicuspid aortic valve with severe aortic valve stenosis is present.  Known from previous mean gradient on a transthoracic echo of 51 mmHg.  •  Mild to moderate aortic insufficiency.  •  Mild mitral and tricuspid regurgitation  •  Today the  patient has so much ventricular ectopy that an accurate aortic valve gradient is impossible to obtain.     The patient was evaluated and deemed to be at greater risk of mortality with traditional open AVR primarily based upon physical exam, laboratory studies, and imaging findings.   The patient was admitted the morning of the scheduled OR procedure.    3/23: patient was taken to the operating room for TAVR with Dr Kc and Dr Lutz. Details of the operation can be found in a separate operative note. Patient tolerated the procedure well. At the end of the procedure, bilateral pedal pulses remained intact. Patient was extubated and transferred to ICU in stable condition. Cardiology continued to follow throughout his admission.  3/24: patient recovering well, groin access sites without hematoma. Patient stable for discharge home. Consulting teams agree.  Patient was discharged home with specific wound care instructions and follow-up appointment CT surgery clinic.      Pertinent Test Results:   Results from last 7 days   Lab Units 03/24/23  0318   WBC 10*3/mm3 12.30*   HEMOGLOBIN g/dL 10.8*   HEMATOCRIT % 32.7*   PLATELETS 10*3/mm3 198     Results from last 7 days   Lab Units 03/24/23  0318   SODIUM mmol/L 133*   POTASSIUM mmol/L 4.5   CHLORIDE mmol/L 102   CO2 mmol/L 22.0   BUN mg/dL 15   CREATININE mg/dL 0.85   GLUCOSE mg/dL 176*   CALCIUM mg/dL 8.4*       Discharge Disposition  Home or Self Care    Discharge Medications     Discharge Medications      New Medications      Instructions Start Date   aspirin 81 MG EC tablet   81 mg, Oral, Daily         Continue These Medications      Instructions Start Date   ascorbic acid 1000 MG tablet  Commonly known as: VITAMIN C   1,000 mg, Oral, Daily      atorvastatin 20 MG tablet  Commonly known as: LIPITOR   20 mg, Oral, Daily      CITRACAL +D3 PO   1 tablet, Oral, Daily      Flaxseed Oil 1400 MG capsule   2 tablets, Oral, Daily      loratadine 10 MG  tablet  Commonly known as: CLARITIN   10 mg, Oral, Daily PRN      losartan 50 MG tablet  Commonly known as: COZAAR   50 mg, Oral, Daily      multivitamin with minerals tablet tablet   1 tablet, Oral, Daily      triamcinolone 0.1 % cream  Commonly known as: KENALOG   1 application, Topical, 2 Times Daily PRN      Turmeric 500 MG capsule   1,000 mg, Oral, Daily             Discharge Diet:     Activity at Discharge:   Activity Instructions     Bathing Restrictions      Showering is permitted. No tub baths, swimming pools, hot tubs until your surgeon approves.    Type of Restriction: Bathing    Bathing Restrictions: No Tub Bath    Driving Restrictions      Type of Restriction: Driving    Driving Restrictions: No Driving (Time Limited)    Length: 1 Week    Lifting Restrictions      Type of Restriction: Lifting    Lifting Restrictions: Other    Explain Lifing Restrictions: Do not lift anything heavier than 10 pounds for 1 week (a gallon of milk weighs 8 pounds).    Other Activity Instructions      Walking is one of the best ways to get   stronger after your TAVR. Start with a 5  minute walk 3-4 times a day. Walk a little   more each day.     Climbing stairs at a slow pace is okay          Special Instructions Following Valve Procedure   • Check incision/groin sites daily. Clean daily with a clean washcloth, soap and water. Pat dry. Do not scrub. Wear loose fitting clothing over groin incision site until healed.  • Shower:  May shower daily, but no tub baths, hot tubs or pools until Cardiac (CT) Surgeon approves.   • Walk daily starting with a 5 minute walk four times daily.  Increase walking by 1-2 minutes per walk as tolerated. No strenuous activity until CT Surgeon approves.   • No lifting over 10 lbs for 7 days.  • No driving for 7 days unless your physician tells you otherwise.   • Heart valve infection: Tell your doctors/dentists that your heart valve has been replaced before any procedures or dental work. You will  be given a special wallet card at discharge to show your doctor/dentist. The card provides recommendations on when antibiotics are needed and the dose.    • Dental care: Reduce your risk of heart valve infection by brushing your teeth twice a day, using dental floss and seeing your dentist at least twice a year.   • Monitor your heart rate, blood pressure and weight. Weigh yourself first thing in the morning wearing similar weight clothing. Report a weight gain of 3 pounds or more in a 24 hour period or 5 pounds in one week to your CT surgeon. Record your numbers and bring to your doctor appointments.  • Report groin/incision site redness, drainage, swelling and/or significant tenderness, leg/feet swelling, chills and/or fever of 101 degrees at anytime or 100 for more than 24 hours, chest pain or increased shortness of breath to [facility] CT Surgery at [phone].   • Call CT Surgery Office for all questions or concerns at [phone].     Follow-up Appointments  Future Appointments   Date Time Provider Department Center   4/17/2023  9:00 AM Virginia Foster APRN [facility]   4/28/2023 11:00 AM [facility] OP ECHO CART RM 4 [facility]   5/5/2023 10:30 AM Cony Benitez APRN [facility]   2/14/2024 11:30 AM Rosa Lutz MD [facility]     Additional Instructions for the Follow-ups that You Need to Schedule     Cardiac Rehab Evaluation and Enrollment   Mar 29, 2023      Reason for Consult: Education         Call MD With Problems / Concerns   As directed      Monitor your heart rate, blood pressure,   and weight. Weigh yourself first thing in   the morning wearing similar weight   clothing. If you gain 3 pounds or more in   24 hours or 5 pounds or more in one   week, call your cardiac surgeon. Record   your numbers and bring to your doctor   appointments.   Call the cardiac surgery office for   groin/incision site redness, drainage,   swelling and/or significant  tenderness,   leg swelling, chills and/or fever of 101   degrees or higher at any time or 100   degrees for more than 24 hours.    If you have any of the following   CALL 911- Don't Drive   ~Chest pain or trouble breathing  ~Sudden numbness or weakness in your   face, arms, or legs   ~Shortness of breath that doesn't get better   when you rest  ~Heart rate faster than 120 beats per minute   with shortness of breath  ~Heart rate lower than 50 beats per minute   or a new irregular heart rate  ~Bowel movement that is dark black or   bright red    Order Comments: Monitor your heart rate, blood pressure, and weight. Weigh yourself first thing in the morning wearing similar weight clothing. If you gain 3 pounds or more in 24 hours or 5 pounds or more in one week, call your cardiac surgeon. Record your numbers and bring to your doctor appointments. Call the cardiac surgery office for groin/incision site redness, drainage, swelling and/or significant tenderness, leg swelling, chills and/or fever of 101 degrees or higher at any time or 100 degrees for more than 24 hours.  If you have any of the following CALL 911- Don't Drive ~Chest pain or trouble breathing ~Sudden numbness or weakness in your face, arms, or legs ~Shortness of breath that doesn't get better when you rest ~Heart rate faster than 120 beats per minute with shortness of breath ~Heart rate lower than 50 beats per minute or a new irregular heart rate ~Bowel movement that is dark black or bright red          Discharge Follow-up with Specialty: Cardiothoracic Surgery   As directed      Follow Up Details: Follow Up in 2-4 Weeks    Specialty: Cardiothoracic Surgery         Discharge Follow-up with Specialty: Heart & Valve Center; 1 Week   As directed      Specialty: Heart & Valve Center    Follow Up: 1 Week    Follow Up Details: Follow Up With Heart & Valve Center Within 7 Days of Discharge. If Discharged on a Weekend, Schedule Follow Up For The Following Friday at  0900.               Time spent for discharge: 30 minutes    Thank you for allowing the [facility] Structural Heart Team to participate in your care.    If you have any questions or concerns please call:     Cardiothoracic Surgery   Dr. Mendez/Norah/De/Augustina at [phone]    Cardiology  Dr. Lutz/Mary at [phone]    Dr. Hdez at [phone]    Jessica Corral PA-C  03/30/23  10:26 EDT

## 2023-03-31 LAB
BH CV ECHO MEAS - AO ROOT AREA (BSA CORRECTED): 2.1 CM2
LV EF 2D ECHO EST: 35 %

## 2023-04-14 NOTE — PROGRESS NOTES
[facility] Cardiothoracic Surgery Office Follow Up Note     Date of Encounter: 2023     Name: Jesus Alberto Sue  : 1955     Referred By: No ref. provider found  PCP: Sameer Desai MD    Chief Complaint:    Chief Complaint   Patient presents with   • Hospital Follow Up Visit     Hosp F/u S/p TAVR on 3/23/23- Aortic valve disorder- Pt states that he is doing well with no complaints.        Subjective      History of Present Illness:    Jesus Alberto Sue is a 67 y.o. male s/p TAVR per Dr. Kc 3/23/2023 with Mcgregor DEMAR 3 valve size 29 mm.  PMH: Nonobstructive CAD, aortic stenosis s/p TAVR, KYLEIGH (intolerant to CPAP), GERD, HLD, and PVCs.  Patient experienced no postoperative complications and was discharged home POD #1.  He has had no readmissions or ER visits.  He presents today for scheduled four week postop follow-up.      Mr. Martinez followed up with primary care provider on 3/31/2023.  Patient states he is feeling well.        Review of Systems:  Review of Systems   Constitutional: Negative for chills, decreased appetite, diaphoresis, fever, malaise/fatigue, night sweats and weight loss.   HENT: Positive for congestion. Negative for hoarse voice, sore throat and stridor.    Cardiovascular: Negative for chest pain, claudication, dyspnea on exertion, irregular heartbeat, leg swelling, near-syncope, orthopnea, palpitations, paroxysmal nocturnal dyspnea and syncope.   Respiratory: Negative for cough, hemoptysis, shortness of breath, sleep disturbances due to breathing, snoring, sputum production and wheezing.    Hematologic/Lymphatic: Negative for adenopathy and bleeding problem. Does not bruise/bleed easily.   Skin: Negative for color change, dry skin, itching, poor wound healing and rash.   Musculoskeletal: Negative for arthritis, back pain, falls and muscle weakness.   Gastrointestinal: Positive for nausea. Negative for abdominal pain, anorexia, constipation, diarrhea, hematochezia,  melena and vomiting.   Neurological: Positive for dizziness (when climbing up a ladder several times some dizziness). Negative for difficulty with concentration, disturbances in coordination, loss of balance, numbness, seizures, vertigo and weakness.   Psychiatric/Behavioral: Negative for altered mental status, depression, memory loss and substance abuse. The patient does not have insomnia and is not nervous/anxious.    Allergic/Immunologic: Positive for environmental allergies. Negative for persistent infections.       I have reviewed the following portions of the patient's history: allergies, current medications, past family history, past medical history, past social history, past surgical history, problem list and ROS and confirm it's accurate.    Allergies:  No Known Allergies    Medications:      Current Outpatient Medications:   •  ascorbic acid (VITAMIN C) 1000 MG tablet, Take 1 tablet by mouth Daily., Disp: , Rfl:   •  aspirin 81 MG EC tablet, Take 1 tablet by mouth Daily., Disp: 30 tablet, Rfl: 11  •  atorvastatin (LIPITOR) 20 MG tablet, Take 1 tablet by mouth Daily., Disp: , Rfl:   •  Calcium-Phosphorus-Vitamin D (CITRACAL +D3 PO), Take 1 tablet by mouth Daily., Disp: , Rfl:   •  Flaxseed, Linseed, (Flaxseed Oil) 1400 MG capsule, Take 2 tablets by mouth Daily., Disp: , Rfl:   •  loratadine (CLARITIN) 10 MG tablet, Take 1 tablet by mouth Daily As Needed for Allergies., Disp: , Rfl:   •  losartan (COZAAR) 50 MG tablet, Take 1 tablet by mouth Daily., Disp: , Rfl:   •  multivitamin with minerals tablet tablet, Take 1 tablet by mouth Daily., Disp: , Rfl:   •  triamcinolone (KENALOG) 0.1 % cream, Apply 1 application topically to the appropriate area as directed 2 (Two) Times a Day As Needed for Rash., Disp: , Rfl:   •  Turmeric 500 MG capsule, Take 2 capsules by mouth Daily., Disp: , Rfl:     History:   Past Medical History:   Diagnosis Date   • Acid reflux    • Aortic stenosis    • COVID-19 12/2022   • Heart  "murmur    • High cholesterol    • History of stomach ulcers    • Hypertension    • PONV (postoperative nausea and vomiting)    • Sleep apnea     CPAP   • TB lung, latent        Past Surgical History:   Procedure Laterality Date   • AORTIC VALVE REPAIR/REPLACEMENT N/A 3/23/2023    Procedure: TRANSCATHETER AORTIC VALVE REPLACEMENT;  Surgeon: Lauri Kc MD;  Location: [facility];  Service: Cardiothoracic;  Laterality: N/A;  fl  9m30s  dose 340mgy  contrast 80ml   • AORTIC VALVE REPAIR/REPLACEMENT N/A 3/23/2023    Procedure: Transfemoral Transcatheter Aortic Valve Replacement;  Surgeon: Rosa Lutz MD;  Location: [facility];  Service: Cardiovascular;  Laterality: N/A;   • CARDIAC CATHETERIZATION      NO INTERVENTION   • COLONOSCOPY     • KNEE SURGERY Left 1977    REPAIR   • LASIK Bilateral 2022       Social History     Socioeconomic History   • Marital status:    • Number of children: 0   Tobacco Use   • Smoking status: Never   • Smokeless tobacco: Former     Types: Snuff     Quit date: 1998   Vaping Use   • Vaping Use: Never used   Substance and Sexual Activity   • Alcohol use: Yes     Alcohol/week: 2.0 standard drinks     Types: 2 Shots of liquor per week     Comment: 7 days a week   • Drug use: Never   • Sexual activity: Defer        Family History   Problem Relation Age of Onset   • Cancer Mother    • Parkinsonism Father    • Heart disease Father    • Heart attack Brother    • No Known Problems Brother    • Obesity Brother        Objective   Physical Exam:  Vitals:    04/17/23 0925   BP: 120/72   Pulse: 83   Temp: 98.6 °F (37 °C)   SpO2: 98%   Weight: 79.8 kg (176 lb)   Height: 172.7 cm (68\")  Comment: pt reports      Body mass index is 26.76 kg/m².    Physical Exam  Vitals reviewed.   Constitutional:       General: He is not in acute distress.  HENT:      Head: Normocephalic and atraumatic.   Eyes:      General: Lids are normal.      Conjunctiva/sclera: Conjunctivae normal. "      Pupils: Pupils are equal, round, and reactive to light.   Neck:      Vascular: No carotid bruit.   Cardiovascular:      Rate and Rhythm: Normal rate and regular rhythm.      Pulses:           Femoral pulses are 2+ on the right side and 2+ on the left side.       Dorsalis pedis pulses are 2+ on the right side and 2+ on the left side.        Posterior tibial pulses are 2+ on the right side and 2+ on the left side.      Heart sounds: S1 normal and S2 normal. No murmur heard.     Comments: Bilateral femoral vascular access sites free from ecchymosis, tenderness, or pulsatile mass.  Femoral pulses palpable +2 bilaterally.  Pulmonary:      Effort: Pulmonary effort is normal. No respiratory distress.      Breath sounds: Normal breath sounds.   Musculoskeletal:         General: Normal range of motion.      Cervical back: Normal range of motion and neck supple.      Right lower leg: No edema.      Left lower leg: No edema.   Lymphadenopathy:      Cervical: No cervical adenopathy.   Skin:     General: Skin is warm and dry.      Capillary Refill: Capillary refill takes less than 2 seconds.   Neurological:      General: No focal deficit present.      Mental Status: He is alert and oriented to person, place, and time.   Psychiatric:         Attention and Perception: Attention normal.         Mood and Affect: Mood normal.         Speech: Speech normal.         Behavior: Behavior normal. Behavior is cooperative.         Imaging/Labs:  XR Chest 2 View: 4/17/2023 Personally reviewed.  Agree w/ Radiologist review  Impression: Mild pulmonary vascular congestion which is accentuated by low lung volumes. Electronically Signed: Alberto Soria  4/17/2023 12:40 PM EDT  Workstation ID: [id number]    XR Chest 1 View: 3/24/2023  Impression: Hypoinflated lungs with mild left basilar airspace disease likely atelectasis. Electronically Signed: Indra Calhoun  3/24/2023 7:36 AM EDT  Workstation ID: [id number]    Intra-Op TAVR Transesophageal Echo:  3/31/2023    •  Left ventricular systolic function is moderately decreased. Estimated left ventricular EF pre 25-30%, post EF = 35-40% •  The left ventricular cavity is mildly dilated. •  Left ventricular wall thickness is consistent with mild concentric hypertrophy. •  Following placement of a 29 mm Julio Cesar 3 pericardial prosthesis: Small paravalvular leak beneath the right coronary sinus. Mean gradient 2 mmHg. Calculated MASSIEL is 2.5 cm2. •  Mild to moderate MR •  This report is being generated soley for the purposes of the registry.  There will be no charge for this interpretation.        Duplex Carotid Ultrasound 2/23/23  Interpretation Summary  •  No plaque is seen in the carotid arteries.  This is a normal carotid duplex.  •  Proximal right internal carotid artery is normal.  •  Proximal left internal carotid artery is normal.      CT ANGIO TAVR CHEST ABDOMEN PELVIS: 3/1/2023    IMPRESSION:   1. CTA of the chest abdomen pelvis, with image data saved per TAVR protocol.   2. Dense and extensive aortic valve calcification. Ascending aortic ectasia to 4.0 cm.   3. Peripheral interstitial lung changes that may represent a degree of pulmonary fibrosis.   4. Superimposed patchy interstitial disease of the right middle lobe, peribronchial thickening and interstitial change of the lower lobes, and shotty mediastinal adenopathy, possibly active infection.   5. Incidentally noted SMA origin stenosis. Incidentally noted small fat-containing periumbilical hernia, and left colon diverticulosis without evidence of diverticulitis.     Electronically Signed: Anup Khalil    3/2/2023 10:02 AM EST     Assessment / Plan      Assessment / Plan:  1. Severe non-rheumatic Aortic stenosis and LVEF 45% (Primary)  2. Status post transcatheter aortic valve replacement (TAVR) using bioprosthesis  - 67 y.o. male s/p TAVR per Dr. Kc 3/23/2023 w/ Mcgregor JULIO CESAR 3 valve size 29 mm.   - PMH: Nonobstructive CAD, aortic stenosis s/p TAVR, KYLEIGH  "(intolerant to CPAP), GERD, HLD, and PVCs.    - Patient experienced no postoperative complications and was discharged home POD #1.   -Compliant with aspirin, statin  -CXR satisfactory without pleural effusions.  Patient states PCP is aware of interstitial fibrosis changes on CT as well as CXR and follows annually.  PMH: \"Latent TB\"  -Enrolled in cardiac rehab at [facility]  -Scheduled for postop transthoracic echocardiogram 4/28/2023  -Scheduled with primary cardiology 5/5/2023  -Bilateral groin vascular access sites healed without hematoma or S/SX infection  - May follow up @ CT Surgery PRN      Follow Up:   Return PRN @ request of Cardiology.   Or sooner for any further concerns or worsening sign and symptoms. If unable to reach us in the office please dial 911 or go to the nearest emergency department.      Gabby Bradford, ANN MARIE  [facility] Cardiothoracic Surgery    Time Spent: I spent 31 minutes caring for Jesus Alberto on this date of service. This time includes time spent by me in the following activities: preparing for the visit, reviewing tests, obtaining and/or reviewing a separately obtained history, performing a medically appropriate examination and/or evaluation, counseling and educating the patient/family/caregiver, documenting information in the medical record, independently interpreting results and communicating that information with the patient/family/caregiver and care coordination.    "

## 2023-04-17 ENCOUNTER — OFFICE VISIT (OUTPATIENT)
Dept: CARDIAC SURGERY | Facility: CLINIC | Age: 68
End: 2023-04-17
Payer: MEDICARE

## 2023-04-17 VITALS
TEMPERATURE: 98.6 F | SYSTOLIC BLOOD PRESSURE: 120 MMHG | BODY MASS INDEX: 26.67 KG/M2 | DIASTOLIC BLOOD PRESSURE: 72 MMHG | HEART RATE: 83 BPM | HEIGHT: 68 IN | OXYGEN SATURATION: 98 % | WEIGHT: 176 LBS

## 2023-04-17 DIAGNOSIS — Z95.3 STATUS POST TRANSCATHETER AORTIC VALVE REPLACEMENT (TAVR) USING BIOPROSTHESIS: ICD-10-CM

## 2023-04-17 DIAGNOSIS — I35.0 AORTIC STENOSIS, SEVERE: Primary | ICD-10-CM

## 2023-04-17 PROCEDURE — 99214 OFFICE O/P EST MOD 30 MIN: CPT | Performed by: NURSE PRACTITIONER

## 2023-04-17 PROCEDURE — 1159F MED LIST DOCD IN RCRD: CPT | Performed by: NURSE PRACTITIONER

## 2023-04-17 PROCEDURE — 1160F RVW MEDS BY RX/DR IN RCRD: CPT | Performed by: NURSE PRACTITIONER

## 2023-04-17 PROCEDURE — 71046 X-RAY EXAM CHEST 2 VIEWS: CPT | Performed by: NURSE PRACTITIONER

## 2023-04-17 PROCEDURE — 3074F SYST BP LT 130 MM HG: CPT | Performed by: NURSE PRACTITIONER

## 2023-04-17 PROCEDURE — 3078F DIAST BP <80 MM HG: CPT | Performed by: NURSE PRACTITIONER

## 2023-04-28 ENCOUNTER — HOSPITAL ENCOUNTER (OUTPATIENT)
Dept: CARDIOLOGY | Facility: HOSPITAL | Age: 68
Discharge: HOME | End: 2023-04-28
Payer: MEDICARE

## 2023-04-28 DIAGNOSIS — I35.9 AORTIC VALVE DISORDER: ICD-10-CM

## 2023-04-28 DIAGNOSIS — I35.0 AORTIC STENOSIS, SEVERE: ICD-10-CM

## 2023-04-28 PROCEDURE — 25010000002 SULFUR HEXAFLUORIDE MICROSPH 60.7-25 MG RECONSTITUTED SUSPENSION

## 2023-04-28 PROCEDURE — 93306 TTE W/DOPPLER COMPLETE: CPT | Performed by: INTERNAL MEDICINE

## 2023-04-28 PROCEDURE — 93306 TTE W/DOPPLER COMPLETE: CPT

## 2023-04-28 RX ADMIN — SULFUR HEXAFLUORIDE 3 ML: KIT at 12:09

## 2023-05-01 LAB
AORTIC ARCH: 4.3 CM
BH CV ECHO MEAS - AO MAX PG: 15.6 MMHG
BH CV ECHO MEAS - AO MEAN PG: 9 MMHG
BH CV ECHO MEAS - AO ROOT DIAM: 3.3 CM
BH CV ECHO MEAS - AO V2 MAX: 197.5 CM/SEC
BH CV ECHO MEAS - AO V2 VTI: 40.6 CM
BH CV ECHO MEAS - AVA(I,D): 1.5 CM2
BH CV ECHO MEAS - EDV(CUBED): 265.9 ML
BH CV ECHO MEAS - EDV(MOD-SP2): 190 ML
BH CV ECHO MEAS - EDV(MOD-SP4): 195 ML
BH CV ECHO MEAS - EF(MOD-BP): 43 %
BH CV ECHO MEAS - EF(MOD-SP2): 47.5 %
BH CV ECHO MEAS - EF(MOD-SP4): 40 %
BH CV ECHO MEAS - ESV(CUBED): 80.6 ML
BH CV ECHO MEAS - ESV(MOD-SP2): 99.7 ML
BH CV ECHO MEAS - ESV(MOD-SP4): 117 ML
BH CV ECHO MEAS - FS: 32.8 %
BH CV ECHO MEAS - IVS/LVPW: 1.04 CM
BH CV ECHO MEAS - IVSD: 0.99 CM
BH CV ECHO MEAS - LA DIMENSION: 4.1 CM
BH CV ECHO MEAS - LAT PEAK E' VEL: 8.2 CM/SEC
BH CV ECHO MEAS - LV MASS(C)D: 267.6 GRAMS
BH CV ECHO MEAS - LV MAX PG: 1.61 MMHG
BH CV ECHO MEAS - LV MEAN PG: 0.97 MMHG
BH CV ECHO MEAS - LV V1 MAX: 63.4 CM/SEC
BH CV ECHO MEAS - LV V1 VTI: 14.8 CM
BH CV ECHO MEAS - LVIDD: 6.4 CM
BH CV ECHO MEAS - LVIDS: 4.3 CM
BH CV ECHO MEAS - LVOT AREA: 3.2 CM2
BH CV ECHO MEAS - LVOT DIAM: 2.7 CM
BH CV ECHO MEAS - LVPWD: 0.95 CM
BH CV ECHO MEAS - MED PEAK E' VEL: 4.1 CM/SEC
BH CV ECHO MEAS - MV A MAX VEL: 117 CM/SEC
BH CV ECHO MEAS - MV DEC SLOPE: 296.9 CM/SEC2
BH CV ECHO MEAS - MV DEC TIME: 0.23 MSEC
BH CV ECHO MEAS - MV E MAX VEL: 75.8 CM/SEC
BH CV ECHO MEAS - MV E/A: 0.65
BH CV ECHO MEAS - MV MAX PG: 6.1 MMHG
BH CV ECHO MEAS - MV MEAN PG: 1.93 MMHG
BH CV ECHO MEAS - MV P1/2T: 85.2 MSEC
BH CV ECHO MEAS - MV V2 VTI: 27.2 CM
BH CV ECHO MEAS - MVA(P1/2T): 2.6 CM2
BH CV ECHO MEAS - MVA(VTI): 1.69 CM2
BH CV ECHO MEAS - PA ACC TIME: 0.11 SEC
BH CV ECHO MEAS - PA PR(ACCEL): 31.5 MMHG
BH CV ECHO MEAS - RAP SYSTOLE: 3 MMHG
BH CV ECHO MEAS - RVSP: 25 MMHG
BH CV ECHO MEAS - SV(LVOT): 45.9 ML
BH CV ECHO MEAS - SV(MOD-SP2): 90.3 ML
BH CV ECHO MEAS - SV(MOD-SP4): 78 ML
BH CV ECHO MEAS - TAPSE (>1.6): 2.15 CM
BH CV ECHO MEAS - TR MAX PG: 22 MMHG
BH CV ECHO MEAS - TR MAX VEL: 234.6 CM/SEC
BH CV ECHO MEASUREMENTS AVERAGE E/E' RATIO: 12.33
BH CV XLRA - RV BASE: 4.1 CM
BH CV XLRA - RV LENGTH: 6.9 CM
BH CV XLRA - RV MID: 3.3 CM
BH CV XLRA - TDI S': 8.6 CM/SEC
LEFT ATRIUM VOLUME INDEX: 40.1 ML/M2
LV EF 2D ECHO EST: 40 %
MAXIMAL PREDICTED HEART RATE: 153 BPM
STRESS TARGET HR: 130 BPM

## 2023-05-05 ENCOUNTER — OFFICE VISIT (OUTPATIENT)
Dept: CARDIOLOGY | Facility: CLINIC | Age: 68
End: 2023-05-05
Payer: MEDICARE

## 2023-05-05 VITALS
DIASTOLIC BLOOD PRESSURE: 70 MMHG | WEIGHT: 166 LBS | OXYGEN SATURATION: 98 % | HEART RATE: 85 BPM | BODY MASS INDEX: 25.16 KG/M2 | HEIGHT: 68 IN | SYSTOLIC BLOOD PRESSURE: 132 MMHG

## 2023-05-05 DIAGNOSIS — K42.9 UMBILICAL HERNIA WITHOUT OBSTRUCTION OR GANGRENE: ICD-10-CM

## 2023-05-05 DIAGNOSIS — Z95.3 STATUS POST TRANSCATHETER AORTIC VALVE REPLACEMENT (TAVR) USING BIOPROSTHESIS: ICD-10-CM

## 2023-05-05 DIAGNOSIS — G47.33 OBSTRUCTIVE SLEEP APNEA SYNDROME: ICD-10-CM

## 2023-05-05 DIAGNOSIS — I50.21 ACUTE SYSTOLIC CONGESTIVE HEART FAILURE: ICD-10-CM

## 2023-05-05 PROBLEM — I10 HYPERTENSION: Status: ACTIVE | Noted: 2023-04-11

## 2023-05-05 PROBLEM — G47.30 SLEEP APNEA: Status: ACTIVE | Noted: 2023-04-11

## 2023-05-05 RX ORDER — METOPROLOL SUCCINATE 25 MG/1
25 TABLET, EXTENDED RELEASE ORAL DAILY
Qty: 90 TABLET | Refills: 1 | Status: SHIPPED | OUTPATIENT
Start: 2023-05-05

## 2023-05-05 RX ORDER — OMEPRAZOLE 40 MG/1
1 CAPSULE, DELAYED RELEASE ORAL DAILY
COMMUNITY
Start: 2023-04-26

## 2023-05-05 RX ORDER — SACUBITRIL AND VALSARTAN 24; 26 MG/1; MG/1
1 TABLET, FILM COATED ORAL 2 TIMES DAILY
Qty: 180 TABLET | Refills: 1 | COMMUNITY
Start: 2023-05-05 | End: 2023-05-05 | Stop reason: DRUGHIGH

## 2023-05-05 RX ORDER — ONDANSETRON 4 MG/1
4 TABLET, FILM COATED ORAL EVERY 6 HOURS PRN
COMMUNITY
Start: 2023-04-28

## 2023-05-05 RX ORDER — SACUBITRIL AND VALSARTAN 49; 51 MG/1; MG/1
1 TABLET, FILM COATED ORAL 2 TIMES DAILY
Qty: 180 TABLET | Refills: 1 | Status: SHIPPED | OUTPATIENT
Start: 2023-05-05

## 2023-05-05 NOTE — PATIENT INSTRUCTIONS
Stop Losartan  start entresto two times a day  Start metoprolol  Repeat echo in 3 months  See us back in 2 weeks. May increase Entresto  Keep eye on low BP/dizziness

## 2023-05-07 PROBLEM — U07.1 COVID-19: Status: RESOLVED | Noted: 2022-12-01 | Resolved: 2023-05-07

## 2023-05-07 PROBLEM — I50.21 ACUTE SYSTOLIC CONGESTIVE HEART FAILURE: Status: ACTIVE | Noted: 2023-05-07

## 2023-05-07 NOTE — ASSESSMENT & PLAN NOTE
Benefiting from PAP therapy.  Plan to continue.  Hopefully AHI will decrease once better mask fit and less leak.  Recommend KYLEIGH precautions for anesthesia.

## 2023-05-07 NOTE — ASSESSMENT & PLAN NOTE
Doing well postop.  Dr. Lutz okayed for patient to move forward with umbilical hernia repair surgery.  Post TAVR EKG obtained today.  No signs of electrical blocks.

## 2023-05-07 NOTE — ASSESSMENT & PLAN NOTE
Doing well postop.  Per Dr. Bolivar arreola to move forward with umbilical hernia repair surgery.  Post TAVR EKG obtained today.

## 2023-05-17 ENCOUNTER — TELEPHONE (OUTPATIENT)
Dept: CARDIOLOGY | Facility: HOSPITAL | Age: 68
End: 2023-05-17
Payer: MEDICARE

## 2023-05-17 NOTE — TELEPHONE ENCOUNTER
Called patient for initial post-TAVR KCCQ. He reports feeling well after procedure and is doing well with cardiac rehab. Started on Entresto 2 weeks ago, will follow up with Milagros Benitez on Friday 5/19. He denies SOA, chest discomfort, dizziness and BLE edema. Post-TAVR echo completed on 4/28, acceptable. Encouraged patient to keep cardiology apts and continue with cardiac rehab and to call with future questions or concerns.     NYHA I  KCCQ 62/70

## 2023-05-19 ENCOUNTER — OFFICE VISIT (OUTPATIENT)
Dept: CARDIOLOGY | Facility: CLINIC | Age: 68
End: 2023-05-19
Payer: MEDICARE

## 2023-05-19 VITALS
SYSTOLIC BLOOD PRESSURE: 140 MMHG | OXYGEN SATURATION: 97 % | WEIGHT: 174 LBS | HEIGHT: 68 IN | BODY MASS INDEX: 26.37 KG/M2 | DIASTOLIC BLOOD PRESSURE: 76 MMHG | HEART RATE: 93 BPM

## 2023-05-19 DIAGNOSIS — Z95.3 STATUS POST TRANSCATHETER AORTIC VALVE REPLACEMENT (TAVR) USING BIOPROSTHESIS: Primary | ICD-10-CM

## 2023-05-19 DIAGNOSIS — Z01.810 PRE-OPERATIVE CARDIOVASCULAR EXAMINATION: ICD-10-CM

## 2023-05-19 DIAGNOSIS — I10 HYPERTENSION, ESSENTIAL: ICD-10-CM

## 2023-05-19 DIAGNOSIS — I50.21 ACUTE SYSTOLIC CONGESTIVE HEART FAILURE: ICD-10-CM

## 2023-05-19 RX ORDER — AMOXICILLIN 500 MG/1
2000 CAPSULE ORAL SEE ADMIN INSTRUCTIONS
Qty: 12 CAPSULE | Refills: 0 | Status: SHIPPED | OUTPATIENT
Start: 2023-05-19

## 2023-05-19 RX ORDER — DICLOFENAC SODIUM 75 MG/1
75 TABLET, DELAYED RELEASE ORAL 2 TIMES DAILY
COMMUNITY

## 2023-05-19 RX ORDER — BENZONATATE 200 MG/1
200 CAPSULE ORAL 3 TIMES DAILY PRN
COMMUNITY

## 2023-05-19 NOTE — LETTER
May 19, 2023     Deep Campos MD  [address]  [address]  [facility] [address]    Patient: Jesus Alberto Sue   YOB: 1955   Date of Visit: 5/19/2023       Dear Deep Campos MD:    Thank you for referring Jesus Alberto Sue to me for evaluation. Below are the relevant portions of my assessment and plan of care.    Encounter Diagnosis and Orders:  Diagnoses and all orders for this visit:    1. Status post transcatheter aortic valve replacement (TAVR) using bioprosthesis (Primary)  Comments:  Doing well symptomatically.  Well-seated valve.  No issues.  Orders:  -     Adult Transthoracic Echo Complete W/ Cont if Necessary Per Protocol; Future  -     amoxicillin (AMOXIL) 500 MG capsule; Take 4 capsules by mouth See Admin Instructions. 4 capsules 1 hour prior to procedure  Dispense: 12 capsule; Refill: 0    2. Acute systolic congestive heart failure  Comments:  Euvolemic.  On medical therapy.  Follow-up echo in 3 months  Orders:  -     Adult Transthoracic Echo Complete W/ Cont if Necessary Per Protocol; Future    3. Hypertension, essential  Comments:  Well-controlled blood pressure log reviewed    4. Pre-operative cardiovascular examination  Comments:  Well optimized for surgery.  Would proceed as planned.  Will need perioperative antibiotics 1 hour prior to incision.  We will route note to Dr. Campos        If you have questions, please do not hesitate to call me. I look forward to following Jesus Alberto along with you.         Sincerely,        Karen Dial MD        CC: No Recipients

## 2023-05-19 NOTE — PROGRESS NOTES
[facility] Provider Note     Date:   2023   Name: Jesus Alberto Sue  :   1955  PCP: Sameer Desai MD    Chief Complaint   Patient presents with   • Congestive Heart Failure       Subjective     History of Present Illness  Jesus Alberto Sue is a 67 y.o. male who presents today for routine follow-up.  He was started on Entresto 2 weeks ago and doing well.  He denies any new chest pain or shortness of breath.  He has only been taking the once a day Entresto but it was supposed to be twice a day.  His blood pressure log shows blood pressures in the 120s.  He is not having any dizziness or lightheadedness.  Again he is tolerating it well.  He is going to get surgery in the near future on her hernia and also dental work.  He knows to have antibiotic prophylaxis before these.  We will prescribe them for him.  We will repeat echo in 3 months.    Cardiomyopathy and sleep related problem with  1.  S/P TAVR bioprosthesis 3/2023  2.  CAD-mild with cath 2021  3.  KYLEIGH  4.  Hyperlipidemia  5.  Hypertension  6.  Latent TB    Baseline AHI of 23 on 2022.    Mild CAD on left heart cath 2021    TAVR bioprothesis 3/2023    Post TAVR echo 2023 EF 35-40%    No Known Allergies    Current Outpatient Medications:   •  ascorbic acid (VITAMIN C) 1000 MG tablet, Take 1 tablet by mouth Daily., Disp: , Rfl:   •  aspirin 81 MG EC tablet, Take 1 tablet by mouth Daily., Disp: 30 tablet, Rfl: 11  •  atorvastatin (LIPITOR) 20 MG tablet, Take 1 tablet by mouth Daily., Disp: , Rfl:   •  benzonatate (TESSALON) 200 MG capsule, Take 1 capsule by mouth 3 (Three) Times a Day As Needed for Cough., Disp: , Rfl:   •  Calcium-Phosphorus-Vitamin D (CITRACAL +D3 PO), Take 1 tablet by mouth Daily., Disp: , Rfl:   •  diclofenac (VOLTAREN) 75 MG EC tablet, Take 1 tablet by mouth 2 (Two) Times a Day., Disp: , Rfl:   •  Flaxseed, Linseed, (Flaxseed Oil) 1400 MG capsule, Take 2 tablets by mouth Daily., Disp: ,  Rfl:   •  loratadine (CLARITIN) 10 MG tablet, Take 1 tablet by mouth Daily As Needed for Allergies., Disp: , Rfl:   •  metoprolol succinate XL (TOPROL-XL) 25 MG 24 hr tablet, Take 1 tablet by mouth Daily., Disp: 90 tablet, Rfl: 1  •  multivitamin with minerals tablet tablet, Take 1 tablet by mouth Daily., Disp: , Rfl:   •  omeprazole (priLOSEC) 40 MG capsule, Take 1 capsule by mouth Daily., Disp: , Rfl:   •  ondansetron (ZOFRAN) 4 MG tablet, Take 1 tablet by mouth Every 6 (Six) Hours As Needed., Disp: , Rfl:   •  sacubitril-valsartan (Entresto) 49-51 MG tablet, Take 1 tablet by mouth 2 (Two) Times a Day., Disp: 180 tablet, Rfl: 1  •  triamcinolone (KENALOG) 0.1 % cream, Apply 1 application topically to the appropriate area as directed 2 (Two) Times a Day As Needed for Rash., Disp: , Rfl:   •  Turmeric 500 MG capsule, Take 2 capsules by mouth Daily., Disp: , Rfl:   •  amoxicillin (AMOXIL) 500 MG capsule, Take 4 capsules by mouth See Admin Instructions. 4 capsules 1 hour prior to procedure, Disp: 12 capsule, Rfl: 0    Past Medical History:   Diagnosis Date   • Acid reflux    • COVID-19 12/2022   • Heart murmur    • High cholesterol    • History of stomach ulcers    • Hypertension    • PONV (postoperative nausea and vomiting)    • Sleep apnea     CPAP   • TB lung, latent       Past Surgical History:   Procedure Laterality Date   • AORTIC VALVE REPAIR/REPLACEMENT N/A 3/23/2023    Procedure: TRANSCATHETER AORTIC VALVE REPLACEMENT;  Surgeon: Lauri Kc MD;  Location: [facility];  Service: Cardiothoracic;  Laterality: N/A;  fl  9m30s  dose 340mgy  contrast 80ml   • AORTIC VALVE REPAIR/REPLACEMENT N/A 3/23/2023    Procedure: Transfemoral Transcatheter Aortic Valve Replacement;  Surgeon: Rosa Lutz MD;  Location: [facility];  Service: Cardiovascular;  Laterality: N/A;   • CARDIAC CATHETERIZATION      NO INTERVENTION   • COLONOSCOPY     • KNEE SURGERY Left 1977    REPAIR   • LASIK Bilateral  "2022     Family History   Problem Relation Age of Onset   • Cancer Mother    • Parkinsonism Father    • Heart disease Father    • Heart attack Brother    • No Known Problems Brother    • Obesity Brother      Social History     Socioeconomic History   • Marital status:    • Number of children: 0   Tobacco Use   • Smoking status: Never   • Smokeless tobacco: Former     Types: Snuff     Quit date: 1998   Vaping Use   • Vaping Use: Never used   Substance and Sexual Activity   • Alcohol use: Yes     Alcohol/week: 2.0 standard drinks     Types: 2 Shots of liquor per week     Comment: 7 days a week   • Drug use: Never   • Sexual activity: Defer       Objective     Vital Signs:  /76   Pulse 93   Ht 172.7 cm (68\")   Wt 78.9 kg (174 lb)   SpO2 97%   BMI 26.46 kg/m²   Estimated body mass index is 26.46 kg/m² as calculated from the following:    Height as of this encounter: 172.7 cm (68\").    Weight as of this encounter: 78.9 kg (174 lb).       BMI is >= 25 and <30. (Overweight) The following options were offered after discussion;: referral to primary care      Physical Exam  Constitutional:       Appearance: Normal appearance. He is well-developed.   HENT:      Head: Normocephalic and atraumatic.   Eyes:      General: No scleral icterus.     Pupils: Pupils are equal, round, and reactive to light.   Neck:      Vascular: No carotid bruit.   Cardiovascular:      Rate and Rhythm: Normal rate and regular rhythm.      Pulses: Normal pulses.           Radial pulses are 2+ on the right side and 2+ on the left side.        Dorsalis pedis pulses are 2+ on the right side and 2+ on the left side.        Posterior tibial pulses are 2+ on the right side and 2+ on the left side.      Heart sounds: Murmur (I/VI systolic murmur) heard.   Pulmonary:      Breath sounds: Normal breath sounds. No wheezing or rhonchi.   Musculoskeletal:      Right lower leg: No edema.      Left lower leg: No edema.   Skin:     Capillary Refill: " Capillary refill takes less than 2 seconds.      Coloration: Skin is not cyanotic.      Nails: There is no clubbing.   Neurological:      Mental Status: He is alert and oriented to person, place, and time.      Motor: No weakness.      Gait: Gait normal.   Psychiatric:         Mood and Affect: Mood normal.         Behavior: Behavior is cooperative.         Thought Content: Thought content normal.         Cognition and Memory: Memory normal.                     Assessment and Plan     Diagnoses and all orders for this visit:    1. Status post transcatheter aortic valve replacement (TAVR) using bioprosthesis (Primary)  Comments:  Doing well symptomatically.  Well-seated valve.  No issues.  Orders:  -     Adult Transthoracic Echo Complete W/ Cont if Necessary Per Protocol; Future  -     amoxicillin (AMOXIL) 500 MG capsule; Take 4 capsules by mouth See Admin Instructions. 4 capsules 1 hour prior to procedure  Dispense: 12 capsule; Refill: 0    2. Acute systolic congestive heart failure  Comments:  Euvolemic.  On medical therapy.  Follow-up echo in 3 months  Orders:  -     Adult Transthoracic Echo Complete W/ Cont if Necessary Per Protocol; Future    3. Hypertension, essential  Comments:  Well-controlled blood pressure log reviewed    4. Pre-operative cardiovascular examination  Comments:  Well optimized for surgery.  Would proceed as planned.  Will need perioperative antibiotics 1 hour prior to incision.  We will route note to Dr. Campos        Recommendations: ER if symptoms increase and Report if any new/changing symptoms immediately          Follow Up  Return in about 2 months (around 7/19/2023) for Next scheduled follow up/ with echo.  Patient was given instructions and counseling regarding his condition or for health maintenance advice. Please see specific information pulled into the AVS if appropriate.

## 2023-05-24 ENCOUNTER — TELEPHONE (OUTPATIENT)
Dept: CARDIOLOGY | Facility: CLINIC | Age: 68
End: 2023-05-24
Payer: MEDICARE

## 2023-05-24 NOTE — TELEPHONE ENCOUNTER
PA initiated. Insurance is needing documentation regarding medication for approval. Waiting on faxed form from insurance. Hub ok to read

## 2023-05-24 NOTE — TELEPHONE ENCOUNTER
----- Message from Karen Dial MD sent at 5/22/2023  4:48 PM EDT -----  Can we check on his PA for Entresto   he sent me a [employer] message wondering why it was not approved  please thanks

## 2023-06-05 DIAGNOSIS — I50.21 ACUTE SYSTOLIC CONGESTIVE HEART FAILURE: ICD-10-CM

## 2023-06-05 RX ORDER — SACUBITRIL AND VALSARTAN 49; 51 MG/1; MG/1
1 TABLET, FILM COATED ORAL 2 TIMES DAILY
Qty: 180 TABLET | Refills: 1 | Status: SHIPPED | OUTPATIENT
Start: 2023-06-05

## 2023-08-02 ENCOUNTER — OFFICE VISIT (OUTPATIENT)
Dept: CARDIOLOGY | Facility: CLINIC | Age: 68
End: 2023-08-02
Payer: MEDICARE

## 2023-08-02 VITALS
HEIGHT: 68 IN | DIASTOLIC BLOOD PRESSURE: 64 MMHG | OXYGEN SATURATION: 97 % | HEART RATE: 75 BPM | WEIGHT: 182 LBS | SYSTOLIC BLOOD PRESSURE: 118 MMHG | BODY MASS INDEX: 27.58 KG/M2

## 2023-08-02 DIAGNOSIS — I10 HYPERTENSION, ESSENTIAL: ICD-10-CM

## 2023-08-02 DIAGNOSIS — I35.0 AORTIC STENOSIS, SEVERE: Primary | ICD-10-CM

## 2023-08-02 DIAGNOSIS — I50.22 CHRONIC SYSTOLIC CONGESTIVE HEART FAILURE: ICD-10-CM

## 2023-09-26 DIAGNOSIS — I50.21 ACUTE SYSTOLIC CONGESTIVE HEART FAILURE: ICD-10-CM

## 2023-09-27 RX ORDER — METOPROLOL SUCCINATE 25 MG/1
TABLET, EXTENDED RELEASE ORAL
Qty: 90 TABLET | Refills: 1 | Status: SHIPPED | OUTPATIENT
Start: 2023-09-27

## 2023-10-17 ENCOUNTER — LAB (OUTPATIENT)
Dept: LAB | Facility: HOSPITAL | Age: 68
End: 2023-10-17
Payer: MEDICARE

## 2023-10-17 ENCOUNTER — OFFICE VISIT (OUTPATIENT)
Dept: CARDIOLOGY | Facility: CLINIC | Age: 68
End: 2023-10-17
Payer: MEDICARE

## 2023-10-17 VITALS
SYSTOLIC BLOOD PRESSURE: 134 MMHG | BODY MASS INDEX: 28.1 KG/M2 | HEART RATE: 75 BPM | DIASTOLIC BLOOD PRESSURE: 72 MMHG | HEIGHT: 68 IN | WEIGHT: 185.4 LBS | OXYGEN SATURATION: 99 %

## 2023-10-17 DIAGNOSIS — I50.23 ACUTE ON CHRONIC SYSTOLIC CHF (CONGESTIVE HEART FAILURE): ICD-10-CM

## 2023-10-17 DIAGNOSIS — I10 HYPERTENSION, ESSENTIAL: ICD-10-CM

## 2023-10-17 DIAGNOSIS — Z95.3 STATUS POST TRANSCATHETER AORTIC VALVE REPLACEMENT (TAVR) USING BIOPROSTHESIS: Primary | ICD-10-CM

## 2023-10-17 DIAGNOSIS — I35.0 NONRHEUMATIC AORTIC VALVE STENOSIS: ICD-10-CM

## 2023-10-17 LAB
ANION GAP SERPL CALCULATED.3IONS-SCNC: 8.6 MMOL/L (ref 5–15)
BUN SERPL-MCNC: 16 MG/DL (ref 8–23)
BUN/CREAT SERPL: 17.6 (ref 7–25)
CALCIUM SPEC-SCNC: 9.3 MG/DL (ref 8.6–10.5)
CHLORIDE SERPL-SCNC: 101 MMOL/L (ref 98–107)
CO2 SERPL-SCNC: 24.4 MMOL/L (ref 22–29)
CREAT SERPL-MCNC: 0.91 MG/DL (ref 0.76–1.27)
EGFRCR SERPLBLD CKD-EPI 2021: 91.8 ML/MIN/1.73
GLUCOSE SERPL-MCNC: 94 MG/DL (ref 65–99)
NT-PROBNP SERPL-MCNC: 404 PG/ML (ref 0–900)
POTASSIUM SERPL-SCNC: 4.5 MMOL/L (ref 3.5–5.2)
SODIUM SERPL-SCNC: 134 MMOL/L (ref 136–145)

## 2023-10-17 PROCEDURE — 36415 COLL VENOUS BLD VENIPUNCTURE: CPT

## 2023-10-17 PROCEDURE — 80048 BASIC METABOLIC PNL TOTAL CA: CPT

## 2023-10-17 PROCEDURE — 83880 ASSAY OF NATRIURETIC PEPTIDE: CPT

## 2023-10-17 RX ORDER — SPIRONOLACTONE 25 MG/1
25 TABLET ORAL DAILY
Qty: 90 TABLET | Refills: 3 | Status: SHIPPED | OUTPATIENT
Start: 2023-10-17

## 2023-10-17 NOTE — PROGRESS NOTES
Follow-up Visit      Date: 10/17/2023  Patient Name: Jesus Alberto Sue  : 1955   MRN: [id number]     Chief Complaint:    Chief Complaint   Patient presents with    Aortic stenosis, severe     6-Mo FU       Problem List     Nonobstructive coronary artery disease  Cardiac catheterization, 2021: Nonobstructive CAD.  Mild aortic stenosis with mean gradient 18 mmHg  Nonrheumatic aortic stenosis  Mild nonobstructive aortic stenosis with mean gradient 18 mmHg noted on cath 2021  Echocardiogram, 2022; EF 50-55%. Mild concentric LVH. LA is mildly dilated. RV mildly enlarged. Severe aortic stenosis with peak/mean pressure gradient of 72.85 mmHg/52.10 mmHg, the AV area by continuity is 0.8 cm2. Mild AR. Mild MR. Diastolic function is abnormal with a septal E = 0.07 m/s. Mild TR. Mild PH. RVSP 42.63 mmHg. 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 There is an aneurysm of the ascending aorta measuring up to 3.9 cm.   TAVR implantation 3/23/2023: Sapient 3 #29 valve placement.  Echo 2023: EF 46-50%, very small perivalvular leak around Julio Cesar #29, mild dilatation of ascending aorta.  PVC's  Noted on EKG 2023  KYLEIGH, intolerant to CPAP  GERD        Subjective     No Known Allergies      Current Outpatient Medications:     ascorbic acid (VITAMIN C) 1000 MG tablet, Take 1 tablet by mouth Daily., Disp: , Rfl:     aspirin 81 MG EC tablet, Take 1 tablet by mouth Daily., Disp: 30 tablet, Rfl: 11    atorvastatin (LIPITOR) 20 MG tablet, Take 1 tablet by mouth Daily., Disp: , Rfl:     benzonatate (TESSALON) 200 MG capsule, Take 1 capsule by mouth 3 (Three) Times a Day As Needed for Cough., Disp: , Rfl:     Calcium-Phosphorus-Vitamin D (CITRACAL +D3 PO), Take 1 tablet by mouth Daily., Disp: , Rfl:     diclofenac (VOLTAREN) 75 MG EC tablet, Take 1 tablet by mouth 2 (Two) Times a Day., Disp: , Rfl:     Flaxseed, Linseed, (Flaxseed Oil) 1400 MG capsule, Take 2 tablets by mouth Daily., Disp: , Rfl:     loratadine  (CLARITIN) 10 MG tablet, Take 1 tablet by mouth Daily As Needed for Allergies., Disp: , Rfl:     metoprolol succinate XL (TOPROL-XL) 25 MG 24 hr tablet, TAKE 1 TABLET DAILY, Disp: 90 tablet, Rfl: 1    multivitamin with minerals tablet tablet, Take 1 tablet by mouth Daily., Disp: , Rfl:     omeprazole (priLOSEC) 40 MG capsule, Take 1 capsule by mouth Daily., Disp: , Rfl:     ondansetron (ZOFRAN) 4 MG tablet, Take 1 tablet by mouth Every 6 (Six) Hours As Needed., Disp: , Rfl:     sacubitril-valsartan (Entresto) 49-51 MG tablet, Take 1 tablet by mouth 2 (Two) Times a Day., Disp: 180 tablet, Rfl: 1    triamcinolone (KENALOG) 0.1 % cream, Apply 1 application  topically to the appropriate area as directed 2 (Two) Times a Day As Needed for Rash., Disp: , Rfl:     Turmeric 500 MG capsule, Take 2 capsules by mouth Daily., Disp: , Rfl:     spironolactone (ALDACTONE) 25 MG tablet, Take 1 tablet by mouth Daily., Disp: 90 tablet, Rfl: 3     History of Present Illness: Jesus Alberto Sue is a 68 y.o. male who is here today for follow-up on severe aortic stenosis s/p TAVR.  Patient sees Dr. Dial for his general cardiology care.  He was last seen a couple of months ago and was doing well patient tells me that he is had some mild increased shortness of breath.  He did cardiac rehab and now exercises on his own.  Patient did not August have a repeat echocardiogram which showed EF is slightly reduced and a small perivalvular leak.  Patient's blood pressure has been on the high end of normal as well recently.  Patient does tell me given that he lives in [place] follow-up with Dr. Dial is more convenient for his general cardiology care.    The following portions of the patient's history were reviewed and updated as appropriate: allergies, current medications and problem list.     Pertinent positives as listed in the HPI.  All other systems reviewed are negative.    Objective     Vitals:    10/17/23 1139   BP: 134/72   BP Location:  "Right arm   Patient Position: Sitting   Cuff Size: Adult   Pulse: 75   SpO2: 99%   Weight: 84.1 kg (185 lb 6.4 oz)   Height: 172.7 cm (68\")     Body mass index is 28.19 kg/m².    Physical Exam   Constitutional: Well-developed, well-nourished, no acute distress  HENT: Normocephalic, atraumatic moist oral mucosa  Neck: Neck supple. No JVD present. No carotid bruits.   Cardiovascular: Regular rate, regular rhythm and normal heart sounds. No murmur heard.   Pulmonary/Chest: Clear to auscultation bilaterally without wheezing, rhonchi, or rails  Abdominal: Nondistended  Musculoskeletal: No obvious deformity  Neurological: Alert and oriented x3, no focal deficits  Extremities: No peripheral edema, palpable PT pulses bilaterally    Lab Review:   CMP:   Lab Results   Component Value Date    GLUCOSE 94 10/17/2023    BUN 16 10/17/2023    CREATININE 0.91 10/17/2023    EGFRIFNONA >60 06/16/2021    EGFRIFAFRI >60 06/16/2021    BCR 17.6 10/17/2023    K 4.5 10/17/2023    CO2 24.4 10/17/2023    CALCIUM 9.3 10/17/2023    ALBUMIN 3.4 (L) 03/24/2023    AST 19 03/24/2023    ALT 12 03/24/2023       CBC:    Lab Results   Component Value Date    WBC 12.30 (H) 03/24/2023    HGB 10.8 (L) 03/24/2023    HCT 32.7 (L) 03/24/2023    MCV 92.4 03/24/2023     03/24/2023       LIPIDS:    No results found for: \"CHOL\", \"CHLPL\", \"TRIG\", \"HDL\", \"LDL\", \"LDLDIRECT\"    HgbA1c:    Lab Results   Component Value Date    HGBA1C 5.10 03/22/2023       Assessment / Plan    Assessment:  1. S/P TAVR using bioprosthesis 3/23/2023    2. Nonrheumatic aortic valve stenosis    3. Hypertension, essential    4. Acute on chronic systolic CHF (congestive heart failure)       Plan:  Patient appears to be slightly fluid overloaded today.  Will check a proBNP as well as a BMP given his slightly reduced ejection fraction.    Start spironolactone 25 mg daily for better blood pressure control as well as GDMT.  Patient will check his blood pressure at home regularly and " report back to us if his blood pressure is not under better control with the addition of spironolactone.      Smoking Cessation:   Patient is not a smoker    Advance Care Planning   ACP discussion was declined by the patient. Patient does not have an advance directive, information provided.        Follow Up    Patient will follow-up as needed with Dr. Lutz.    Lizet Turner PA-C

## 2023-12-07 DIAGNOSIS — I50.21 ACUTE SYSTOLIC CONGESTIVE HEART FAILURE: ICD-10-CM

## 2023-12-08 RX ORDER — SACUBITRIL AND VALSARTAN 49; 51 MG/1; MG/1
1 TABLET, FILM COATED ORAL 2 TIMES DAILY
Qty: 180 TABLET | Refills: 1 | Status: SHIPPED | OUTPATIENT
Start: 2023-12-08

## 2024-02-05 ENCOUNTER — OFFICE VISIT (OUTPATIENT)
Dept: CARDIOLOGY | Facility: CLINIC | Age: 69
End: 2024-02-05
Payer: MEDICARE

## 2024-02-05 VITALS
DIASTOLIC BLOOD PRESSURE: 72 MMHG | HEIGHT: 68 IN | SYSTOLIC BLOOD PRESSURE: 126 MMHG | OXYGEN SATURATION: 98 % | HEART RATE: 111 BPM | BODY MASS INDEX: 27.58 KG/M2 | WEIGHT: 182 LBS

## 2024-02-05 DIAGNOSIS — I10 HYPERTENSION, ESSENTIAL: ICD-10-CM

## 2024-02-05 DIAGNOSIS — I50.22 CHRONIC SYSTOLIC CONGESTIVE HEART FAILURE: ICD-10-CM

## 2024-02-05 DIAGNOSIS — G47.33 OBSTRUCTIVE SLEEP APNEA SYNDROME: ICD-10-CM

## 2024-02-05 DIAGNOSIS — Z95.3 STATUS POST TRANSCATHETER AORTIC VALVE REPLACEMENT (TAVR) USING BIOPROSTHESIS: Primary | ICD-10-CM

## 2024-02-05 PROCEDURE — 3078F DIAST BP <80 MM HG: CPT | Performed by: INTERNAL MEDICINE

## 2024-02-05 PROCEDURE — 3074F SYST BP LT 130 MM HG: CPT | Performed by: INTERNAL MEDICINE

## 2024-02-05 PROCEDURE — 99214 OFFICE O/P EST MOD 30 MIN: CPT | Performed by: INTERNAL MEDICINE

## 2024-02-05 NOTE — PROGRESS NOTES
[facility] Provider Note     Date:   2024   Name: Jesus Alberto Sue  :   1955  PCP: Sameer Desai MD    Chief Complaint   Patient presents with    Sleep Apnea    Hypertension       Subjective     History of Present Illness  Jesus Alberto Sue is a 68 y.o. male who presents today for follow-up.  He reports doing well.  He was started on spironolactone last visit at [facility] valve clinic.  He is going to continue following here only.  He denies any new chest pain.  His wife is concerned about a 14 pound weight gain very slowly over the last 6 months.  He does not appear to have excessive fluid.  We discussed calorie counting and may be cutting out alcohol or cutting back alcohol as a way to decrease calorie intake.  He is going to increase exercise as well.  [facility]  His PAP download was reviewed.  He has good compliance and control there.      Cardiomyopathy and sleep related problem with  1.  Aortic stenosis S/P TAVR 3/2023 Sapient 3 #29 valve placement.  2.  CAD-mild with cath 2021  3.  KYLEIGH - Baseline AHI of 23 on 2022.  4.  Hyperlipidemia  5.  Hypertension  6.  Latent TB  7.  CHF w reduced EF - Post TAVR echo 2023 EF 35-40% - improved to 45-50% 23 on medical therapy.    No Known Allergies    Current Outpatient Medications:     ascorbic acid (VITAMIN C) 1000 MG tablet, Take 1 tablet by mouth Daily., Disp: , Rfl:     aspirin 81 MG EC tablet, Take 1 tablet by mouth Daily., Disp: 30 tablet, Rfl: 11    atorvastatin (LIPITOR) 20 MG tablet, Take 1 tablet by mouth Daily., Disp: , Rfl:     benzonatate (TESSALON) 200 MG capsule, Take 1 capsule by mouth 3 (Three) Times a Day As Needed for Cough., Disp: , Rfl:     Calcium-Phosphorus-Vitamin D (CITRACAL +D3 PO), Take 1 tablet by mouth Daily., Disp: , Rfl:     diclofenac (VOLTAREN) 75 MG EC tablet, Take 1 tablet by mouth 2 (Two) Times a Day., Disp: , Rfl:     Entresto 49-51 MG tablet,  TAKE 1 TABLET TWICE A DAY, Disp: 180 tablet, Rfl: 1    Flaxseed, Linseed, (Flaxseed Oil) 1400 MG capsule, Take 2 tablets by mouth Daily., Disp: , Rfl:     loratadine (CLARITIN) 10 MG tablet, Take 1 tablet by mouth Daily As Needed for Allergies., Disp: , Rfl:     metoprolol succinate XL (TOPROL-XL) 25 MG 24 hr tablet, TAKE 1 TABLET DAILY, Disp: 90 tablet, Rfl: 1    multivitamin with minerals tablet tablet, Take 1 tablet by mouth Daily., Disp: , Rfl:     omeprazole (priLOSEC) 40 MG capsule, Take 1 capsule by mouth Daily., Disp: , Rfl:     ondansetron (ZOFRAN) 4 MG tablet, Take 1 tablet by mouth Every 6 (Six) Hours As Needed., Disp: , Rfl:     spironolactone (ALDACTONE) 25 MG tablet, Take 1 tablet by mouth Daily., Disp: 90 tablet, Rfl: 3    triamcinolone (KENALOG) 0.1 % cream, Apply 1 Application topically to the appropriate area as directed 2 (Two) Times a Day As Needed for Rash., Disp: , Rfl:     Turmeric 500 MG capsule, Take 2 capsules by mouth Daily., Disp: , Rfl:     Past Medical History:   Diagnosis Date    Acid reflux     COVID-19 12/2022    Heart murmur     High cholesterol     History of stomach ulcers     Hypertension     PONV (postoperative nausea and vomiting)     Sleep apnea     CPAP    TB lung, latent       Past Surgical History:   Procedure Laterality Date    AORTIC VALVE REPAIR/REPLACEMENT N/A 3/23/2023    Procedure: TRANSCATHETER AORTIC VALVE REPLACEMENT;  Surgeon: Lauri Kc MD;  Location: [facility];  Service: Cardiothoracic;  Laterality: N/A;  fl  9m30s  dose 340mgy  contrast 80ml    AORTIC VALVE REPAIR/REPLACEMENT N/A 3/23/2023    Procedure: Transfemoral Transcatheter Aortic Valve Replacement;  Surgeon: Rosa Lutz MD;  Location: [facility];  Service: Cardiovascular;  Laterality: N/A;    CARDIAC CATHETERIZATION      NO INTERVENTION    COLONOSCOPY      KNEE SURGERY Left 1977    REPAIR    LASIK Bilateral 2022     Family History   Problem Relation Age of Onset     "Cancer Mother     Parkinsonism Father     Heart disease Father     Heart attack Brother     No Known Problems Brother     Obesity Brother      Social History     Socioeconomic History    Marital status:     Number of children: 0   Tobacco Use    Smoking status: Never     Passive exposure: Never    Smokeless tobacco: Former     Types: Snuff     Quit date: 1998   Vaping Use    Vaping Use: Never used   Substance and Sexual Activity    Alcohol use: Yes     Alcohol/week: 2.0 standard drinks of alcohol     Types: 2 Shots of liquor per week     Comment: 7 days a week    Drug use: Never    Sexual activity: Defer       Objective     Vital Signs:  /72   Pulse 111   Ht 172.7 cm (68\")   Wt 82.6 kg (182 lb)   SpO2 98%   BMI 27.67 kg/m²   Estimated body mass index is 27.67 kg/m² as calculated from the following:    Height as of this encounter: 172.7 cm (68\").    Weight as of this encounter: 82.6 kg (182 lb).         Physical Exam  Vitals reviewed.   Constitutional:       General: He is not in acute distress.     Appearance: Normal appearance.   HENT:      Head: Normocephalic and atraumatic.      Mouth/Throat:      Mouth: Mucous membranes are moist.   Eyes:      Conjunctiva/sclera: Conjunctivae normal.   Neck:      Vascular: No carotid bruit.   Cardiovascular:      Rate and Rhythm: Normal rate and regular rhythm.      Pulses: Normal pulses.      Heart sounds: Murmur heard.   Pulmonary:      Effort: Pulmonary effort is normal. No respiratory distress.      Breath sounds: Normal breath sounds. No wheezing or rhonchi.   Abdominal:      General: Abdomen is flat.      Palpations: Abdomen is soft.   Musculoskeletal:      Cervical back: Normal range of motion and neck supple.      Right lower leg: No edema.      Left lower leg: No edema.   Skin:     General: Skin is warm and dry.      Coloration: Skin is not jaundiced.   Neurological:      General: No focal deficit present.      Mental Status: He is alert and oriented " to person, place, and time. Mental status is at baseline.      GCS: GCS eye subscore is 4. GCS verbal subscore is 5. GCS motor subscore is 6.      Cranial Nerves: No cranial nerve deficit.      Motor: No weakness.      Gait: Gait normal.   Psychiatric:         Mood and Affect: Mood and affect normal. Mood is not anxious.         Speech: Speech normal.         Behavior: Behavior normal.                     Assessment and Plan     Diagnoses and all orders for this visit:    1. Status post transcatheter aortic valve replacement (TAVR) using bioprosthesis (Primary)  Comments:  Due for echo.  Orders:  -     Adult Transthoracic Echo Complete W/ Cont if Necessary Per Protocol; Future    2. Chronic systolic congestive heart failure  Comments:  On medical therapy.  Euvolemic.  I think the increase in his weight is not fluid related.  BNP drawn in October reviewed.    3. Hypertension, essential  Comments:  Controlled.    4. Obstructive sleep apnea syndrome  Comments:  Download reviewed.  Good compliance and control.              Follow Up  Return in about 2 months (around 4/5/2024) for results, Recheck symptoms.    Karen Dial MD   02/05/2024     Please note that this explicitly excludes time spent on other separate billable services such as performing procedures or test interpretation, when applicable.    This note was created using dictation software which occasionally transcribes nonsensical phrases. Please contact the provider if any clarification is needed.

## 2024-03-21 DIAGNOSIS — I50.21 ACUTE SYSTOLIC CONGESTIVE HEART FAILURE: ICD-10-CM

## 2024-03-22 RX ORDER — METOPROLOL SUCCINATE 25 MG/1
TABLET, EXTENDED RELEASE ORAL
Qty: 90 TABLET | Refills: 1 | Status: SHIPPED | OUTPATIENT
Start: 2024-03-22

## 2024-04-05 DIAGNOSIS — I50.21 ACUTE SYSTOLIC CONGESTIVE HEART FAILURE: ICD-10-CM

## 2024-04-05 RX ORDER — SACUBITRIL AND VALSARTAN 49; 51 MG/1; MG/1
1 TABLET, FILM COATED ORAL 2 TIMES DAILY
Qty: 180 TABLET | Refills: 1 | COMMUNITY
Start: 2024-04-05

## 2024-04-11 ENCOUNTER — AMBULATORY SURGICAL CENTER (OUTPATIENT)
Dept: URBAN - METROPOLITAN AREA SURGERY 10 | Facility: SURGERY | Age: 69
End: 2024-04-11
Payer: OTHER GOVERNMENT

## 2024-04-11 ENCOUNTER — OFFICE (OUTPATIENT)
Dept: URBAN - METROPOLITAN AREA PATHOLOGY 4 | Facility: PATHOLOGY | Age: 69
End: 2024-04-11
Payer: OTHER GOVERNMENT

## 2024-04-11 DIAGNOSIS — Z86.010 PERSONAL HISTORY OF COLONIC POLYPS: ICD-10-CM

## 2024-04-11 DIAGNOSIS — K64.1 SECOND DEGREE HEMORRHOIDS: ICD-10-CM

## 2024-04-11 DIAGNOSIS — D12.2 BENIGN NEOPLASM OF ASCENDING COLON: ICD-10-CM

## 2024-04-11 DIAGNOSIS — D12.4 BENIGN NEOPLASM OF DESCENDING COLON: ICD-10-CM

## 2024-04-11 DIAGNOSIS — Z09 ENCOUNTER FOR FOLLOW-UP EXAMINATION AFTER COMPLETED TREATMEN: ICD-10-CM

## 2024-04-11 DIAGNOSIS — K57.90 DIVERTICULOSIS OF INTESTINE, PART UNSPECIFIED, WITHOUT PERFO: ICD-10-CM

## 2024-04-11 PROCEDURE — 45385 COLONOSCOPY W/LESION REMOVAL: CPT | Mod: PT | Performed by: INTERNAL MEDICINE

## 2024-04-11 PROCEDURE — 88305 TISSUE EXAM BY PATHOLOGIST: CPT | Performed by: PATHOLOGY

## 2024-04-29 ENCOUNTER — OFFICE VISIT (OUTPATIENT)
Dept: CARDIOLOGY | Facility: CLINIC | Age: 69
End: 2024-04-29
Payer: MEDICARE

## 2024-04-29 ENCOUNTER — TELEPHONE (OUTPATIENT)
Dept: CARDIOLOGY | Facility: CLINIC | Age: 69
End: 2024-04-29

## 2024-04-29 VITALS
SYSTOLIC BLOOD PRESSURE: 118 MMHG | DIASTOLIC BLOOD PRESSURE: 78 MMHG | BODY MASS INDEX: 27.58 KG/M2 | WEIGHT: 182 LBS | HEIGHT: 68 IN | HEART RATE: 82 BPM | OXYGEN SATURATION: 97 %

## 2024-04-29 DIAGNOSIS — G47.33 OBSTRUCTIVE SLEEP APNEA SYNDROME: ICD-10-CM

## 2024-04-29 DIAGNOSIS — I10 HYPERTENSION, ESSENTIAL: ICD-10-CM

## 2024-04-29 DIAGNOSIS — Z95.3 STATUS POST TRANSCATHETER AORTIC VALVE REPLACEMENT (TAVR) USING BIOPROSTHESIS: Primary | ICD-10-CM

## 2024-04-29 DIAGNOSIS — J84.10 PULMONARY FIBROSIS: ICD-10-CM

## 2024-04-29 NOTE — TELEPHONE ENCOUNTER
CALLED PATIENT BACK TO LET HIM KNOW THAT  WANTS TO WAIT TILL HIS 6MTH APPT TO ORDER HIS ECHO IN CASE HE MAY NEED TO HAVE IT DONE SOONER. PATIENT WAS OKAY WITH THIS.

## 2024-04-29 NOTE — TELEPHONE ENCOUNTER
Caller: Jesus Alberto Sue    Relationship: Self    Best call back number: [phone]     What is the best time to reach you: ANYTIME TODAY, WILL BE OUT TOMORROW 9-3     Who are you requesting to speak with (clinical staff, provider,  specific staff member): JUAN     Do you know the name of the person who called: JUAN     What was the call regarding: PT REPORTS THAT THEY WERE GIVEN A CALL  BUT THERE IS NO DOCUMENTATION IN THE CHART ABOUT THIS. IF THIS WAS NOT A MISTAKE, PLEASE CALL PT BACK.     STATES THIS WAS ABOUT SCHD AN ECHO     Is it okay if the provider responds through MyChart: CALL

## 2024-04-30 NOTE — PROGRESS NOTES
[facility] Provider Note     Date:   2024   Name: Jesus Alberto Sue  :   1955  PCP: Sameer Desai MD    Chief Complaint   Patient presents with    Sleep Apnea    Follow-up     2 month with Echo       Subjective     History of Present Illness  Jesus Alberto Sue is a 68 y.o. male who presents today for follow-up on sleep apnea and TAVR.  He is overall doing well.  He has been going to the [facility] to exercise regularly.  He denies any new chest pain or shortness of breath.  No lower extremity edema.  We did note that his aortic valve gradient had increased somewhat from last year.  But he is asymptomatic.  He is not in the severe range at this time.  Will monitor.  His wife is concerned about some fibrotic changes seen on CT scan done prior to his aortic valve surgery.  This was mostly a scan done for valve sizing.  But they do mention the changes.  He has not had any cough or shortness of breath.  Is unclear if this was thought to not be significant or if it was thought not to require follow-up.  [facility] to follow it.    Cardiomyopathy and sleep related problem with  1.  Aortic stenosis S/P TAVR 3/2023 Sapient 3 #29 valve placement.  2.  CAD-mild with cath 2021  3.  KYLEIGH - Baseline AHI of 23 on 2022.  4.  Hyperlipidemia  5.  Hypertension  6.  Latent TB  7.  CHF w reduced EF - Post TAVR echo 2023 EF 35-40% - improved to 45-50% 23 on medical therapy.    No Known Allergies    Current Outpatient Medications:     ascorbic acid (VITAMIN C) 1000 MG tablet, Take 1 tablet by mouth Daily., Disp: , Rfl:     aspirin 81 MG EC tablet, Take 1 tablet by mouth Daily., Disp: 30 tablet, Rfl: 11    atorvastatin (LIPITOR) 20 MG tablet, Take 1 tablet by mouth Daily., Disp: , Rfl:     benzonatate (TESSALON) 200 MG capsule, Take 1 capsule by mouth 3 (Three) Times a Day As Needed for Cough., Disp: , Rfl:      Calcium-Phosphorus-Vitamin D (CITRACAL +D3 PO), Take 1 tablet by mouth Daily., Disp: , Rfl:     diclofenac (VOLTAREN) 75 MG EC tablet, Take 1 tablet by mouth 2 (Two) Times a Day., Disp: , Rfl:     Flaxseed, Linseed, (Flaxseed Oil) 1400 MG capsule, Take 2 tablets by mouth Daily., Disp: , Rfl:     loratadine (CLARITIN) 10 MG tablet, Take 1 tablet by mouth Daily As Needed for Allergies., Disp: , Rfl:     metoprolol succinate XL (TOPROL-XL) 25 MG 24 hr tablet, TAKE 1 TABLET DAILY, Disp: 90 tablet, Rfl: 1    multivitamin with minerals tablet tablet, Take 1 tablet by mouth Daily., Disp: , Rfl:     omeprazole (priLOSEC) 40 MG capsule, Take 1 capsule by mouth Daily., Disp: , Rfl:     ondansetron (ZOFRAN) 4 MG tablet, Take 1 tablet by mouth Every 6 (Six) Hours As Needed., Disp: , Rfl:     sacubitril-valsartan (Entresto) 49-51 MG tablet, Take 1 tablet by mouth 2 (Two) Times a Day., Disp: 180 tablet, Rfl: 1    spironolactone (ALDACTONE) 25 MG tablet, Take 1 tablet by mouth Daily., Disp: 90 tablet, Rfl: 3    triamcinolone (KENALOG) 0.1 % cream, Apply 1 Application topically to the appropriate area as directed 2 (Two) Times a Day As Needed for Rash., Disp: , Rfl:     Turmeric 500 MG capsule, Take 2 capsules by mouth Daily., Disp: , Rfl:     Past Medical History:   Diagnosis Date    Acid reflux     COVID-19 12/2022    Heart murmur     High cholesterol     History of stomach ulcers     Hypertension     PONV (postoperative nausea and vomiting)     Sleep apnea     CPAP    TB lung, latent       Past Surgical History:   Procedure Laterality Date    AORTIC VALVE REPAIR/REPLACEMENT N/A 3/23/2023    Procedure: TRANSCATHETER AORTIC VALVE REPLACEMENT;  Surgeon: Lauri Kc MD;  Location: [facility];  Service: Cardiothoracic;  Laterality: N/A;  fl  9m30s  dose 340mgy  contrast 80ml    AORTIC VALVE REPAIR/REPLACEMENT N/A 3/23/2023    Procedure: Transfemoral Transcatheter Aortic Valve Replacement;  Surgeon: Rosa Lutz  "MD CHARLES;  Location: [facility];  Service: Cardiovascular;  Laterality: N/A;    CARDIAC CATHETERIZATION      NO INTERVENTION    COLONOSCOPY      KNEE SURGERY Left 1977    REPAIR    LASIK Bilateral 2022     Family History   Problem Relation Age of Onset    Cancer Mother     Parkinsonism Father     Heart disease Father     Heart attack Brother     No Known Problems Brother     Obesity Brother      Social History     Socioeconomic History    Marital status:     Number of children: 0   Tobacco Use    Smoking status: Never     Passive exposure: Never    Smokeless tobacco: Former     Types: Snuff     Quit date: 1998   Vaping Use    Vaping status: Never Used   Substance and Sexual Activity    Alcohol use: Yes     Alcohol/week: 2.0 standard drinks of alcohol     Types: 2 Shots of liquor per week     Comment: 7 days a week    Drug use: Never    Sexual activity: Defer       Objective     Vital Signs:  /78   Pulse 82   Ht 172.7 cm (68\")   Wt 82.6 kg (182 lb)   SpO2 97%   BMI 27.67 kg/m²   Estimated body mass index is 27.67 kg/m² as calculated from the following:    Height as of this encounter: 172.7 cm (68\").    Weight as of this encounter: 82.6 kg (182 lb).         Physical Exam  Vitals reviewed.   Constitutional:       General: He is not in acute distress.     Appearance: Normal appearance.   HENT:      Head: Normocephalic and atraumatic.      Mouth/Throat:      Mouth: Mucous membranes are moist.   Eyes:      Conjunctiva/sclera: Conjunctivae normal.   Neck:      Vascular: No carotid bruit.   Cardiovascular:      Rate and Rhythm: Normal rate and regular rhythm.      Pulses: Normal pulses.      Heart sounds: Murmur heard.   Pulmonary:      Effort: Pulmonary effort is normal. No respiratory distress.      Breath sounds: Normal breath sounds. No wheezing or rhonchi.   Abdominal:      General: Abdomen is flat.      Palpations: Abdomen is soft.   Musculoskeletal:      Cervical back: Normal range of motion and " neck supple.      Right lower leg: No edema.      Left lower leg: No edema.   Skin:     General: Skin is warm and dry.      Coloration: Skin is not jaundiced.   Neurological:      General: No focal deficit present.      Mental Status: He is alert and oriented to person, place, and time. Mental status is at baseline.      GCS: GCS eye subscore is 4. GCS verbal subscore is 5. GCS motor subscore is 6.      Cranial Nerves: No cranial nerve deficit.      Motor: No weakness.      Gait: Gait normal.   Psychiatric:         Mood and Affect: Mood and affect normal. Mood is not anxious.         Speech: Speech normal.         Behavior: Behavior normal.                     Assessment and Plan     Diagnoses and all orders for this visit:    1. Status post transcatheter aortic valve replacement (TAVR) using bioprosthesis (Primary)  Comments:  Stable.  Echocardiogram reviewed.  Mildly elevated gradients today.  Will  watch.  Follow-up symptoms in 6 months.  Echo at 1 year unless symptoms necessitate.    2. Hypertension, essential  Comments:  Well-controlled.  Continue current medication.    3. Obstructive sleep apnea syndrome  Comments:  Benefiting from PAP therapy and plan to continue.    4. Pulmonary fibrosis  Comments:  Prior CTA showed possible fibrosis.  No cough or shortness of breath.  [facility]  Do[facility] tomorrow.        Recommendations: Report if any new/changing symptoms immediately      Follow Up  Return in about 6 months (around 10/29/2024) for Next scheduled follow up.    Karen Dial MD   04/29/2024     Please note that this explicitly excludes time spent on other separate billable services such as performing procedures or test interpretation, when applicable.    This note was created using dictation software which occasionally transcribes nonsensical phrases. Please contact the provider if any clarification is needed.

## 2024-05-15 ENCOUNTER — TELEPHONE (OUTPATIENT)
Dept: CARDIOLOGY | Facility: HOSPITAL | Age: 69
End: 2024-05-15
Payer: MEDICARE

## 2024-07-05 DIAGNOSIS — I50.21 ACUTE SYSTOLIC CONGESTIVE HEART FAILURE: ICD-10-CM

## 2024-07-08 RX ORDER — SACUBITRIL AND VALSARTAN 49; 51 MG/1; MG/1
1 TABLET, FILM COATED ORAL 2 TIMES DAILY
Qty: 180 TABLET | Refills: 1 | Status: SHIPPED | OUTPATIENT
Start: 2024-07-08

## 2024-08-16 RX ORDER — SPIRONOLACTONE 25 MG/1
25 TABLET ORAL DAILY
Qty: 90 TABLET | Refills: 3 | OUTPATIENT
Start: 2024-08-16

## 2024-08-27 DIAGNOSIS — I50.21 ACUTE SYSTOLIC CONGESTIVE HEART FAILURE: ICD-10-CM

## 2024-08-28 RX ORDER — METOPROLOL SUCCINATE 25 MG/1
TABLET, EXTENDED RELEASE ORAL
Qty: 90 TABLET | Refills: 1 | Status: SHIPPED | OUTPATIENT
Start: 2024-08-28

## 2024-09-13 ENCOUNTER — TELEPHONE (OUTPATIENT)
Dept: CARDIOLOGY | Facility: CLINIC | Age: 69
End: 2024-09-13
Payer: MEDICARE

## 2024-09-13 DIAGNOSIS — Z95.3 STATUS POST TRANSCATHETER AORTIC VALVE REPLACEMENT (TAVR) USING BIOPROSTHESIS: Primary | ICD-10-CM

## 2024-09-13 NOTE — TELEPHONE ENCOUNTER
Patient has been taking amoxicillin for dental work that he has done, he is going to a periodontist Dr. Noble and he is wondering if he still needs to take the amoxicillin if so he will need refills. If someone could call him at [phone].

## 2024-09-13 NOTE — TELEPHONE ENCOUNTER
His dentist last month give him the amoxicillin, and it was giving by 5/19/23 for a procedure, But he has a procedure coming up. He states that he was told last time that if he's having a procedure done for dental that he is to take 4 tablets of the Amoxicillin couple hours before the procedure. Please advise, thanks.

## 2024-09-16 RX ORDER — AMOXICILLIN 500 MG/1
2000 CAPSULE ORAL SEE ADMIN INSTRUCTIONS
Qty: 12 CAPSULE | Refills: 0 | Status: SHIPPED | OUTPATIENT
Start: 2024-09-16

## 2024-09-16 NOTE — TELEPHONE ENCOUNTER
I spoke with patient. He states he will be seeing the periodonist next week for the first time, so he is aware that it would probably be a consult first. I told patient we could send in the Amoxicillin for him to have on hand he verbally understood. I told patient that once he has that consult with the periodonist to let them know about having the amoxicillin on hand and what they would like for him to do for the procedure he'll be having. He would like the medication sent to [facility] in [place]. Thanks :)

## 2024-10-21 ENCOUNTER — OFFICE VISIT (OUTPATIENT)
Dept: CARDIOLOGY | Facility: CLINIC | Age: 69
End: 2024-10-21
Payer: MEDICARE

## 2024-10-21 VITALS
HEIGHT: 68 IN | DIASTOLIC BLOOD PRESSURE: 78 MMHG | OXYGEN SATURATION: 99 % | WEIGHT: 182 LBS | SYSTOLIC BLOOD PRESSURE: 132 MMHG | HEART RATE: 76 BPM | BODY MASS INDEX: 27.58 KG/M2

## 2024-10-21 DIAGNOSIS — G47.33 OBSTRUCTIVE SLEEP APNEA SYNDROME: ICD-10-CM

## 2024-10-21 DIAGNOSIS — I50.21 ACUTE SYSTOLIC CONGESTIVE HEART FAILURE: ICD-10-CM

## 2024-10-21 DIAGNOSIS — Z95.3 STATUS POST TRANSCATHETER AORTIC VALVE REPLACEMENT (TAVR) USING BIOPROSTHESIS: Primary | ICD-10-CM

## 2024-10-21 DIAGNOSIS — I10 HYPERTENSION, ESSENTIAL: ICD-10-CM

## 2024-10-21 PROCEDURE — 3078F DIAST BP <80 MM HG: CPT | Performed by: INTERNAL MEDICINE

## 2024-10-21 PROCEDURE — 93000 ELECTROCARDIOGRAM COMPLETE: CPT | Performed by: INTERNAL MEDICINE

## 2024-10-21 PROCEDURE — 3075F SYST BP GE 130 - 139MM HG: CPT | Performed by: INTERNAL MEDICINE

## 2024-10-21 PROCEDURE — 99214 OFFICE O/P EST MOD 30 MIN: CPT | Performed by: INTERNAL MEDICINE

## 2024-10-21 RX ORDER — SPIRONOLACTONE 25 MG/1
25 TABLET ORAL DAILY
Qty: 30 TABLET | Refills: 11 | Status: SHIPPED | OUTPATIENT
Start: 2024-10-21

## 2024-10-21 RX ORDER — TRIAMCINOLONE ACETONIDE 1 MG/G
OINTMENT TOPICAL
COMMUNITY
Start: 2024-08-29

## 2024-10-21 NOTE — PROGRESS NOTES
[facility] Provider Note     Date:   10/21/2024   Name: Jesus Alberto Sue  :   1955  PCP: Sameer Desai MD    Chief Complaint   Patient presents with   • Aortic Stenosis     Pt here for six month follow up AS.  S/P AVR (TAVR).KYLEIGH follow up with download.       Subjective     History of Present Illness  Jesus Alberto Sue is a 69 y.o. male who presents today for previous TAVR.  Has reports of no chest pain or palpitations.  No shortness of air.  No edema in lower extremities.  Remains active with walking and [facility]  Does report has coughing. Wife reports it is worse at night and in the morning. Pt reports he thinks it is from Entresto.  Pt would like to come off of Spironolactone. Reports highest B/P is about 130 and reports legs never swell.  Has reduce in appetite.  Wife reports drinks bourbon every night.  Reports C-PAP and reports Good compliance. Reports no problems with pressures or mask. AHI 7.3.     Cardiomyopathy and sleep related problem with  1.  Aortic stenosis S/P TAVR 3/2023 Sapient 3 #29 valve placement.  2.  CAD-mild with cath 2021  3.  KYLEIGH - Baseline AHI of 23 on 2022.  4.  Hyperlipidemia  5.  Hypertension  6.  Latent TB  7.  CHF w reduced EF - Post TAVR echo 2023 EF 35-40% - improved to 45-50% 23 on medical therapy.    No Known Allergies    Current Outpatient Medications:   •  ascorbic acid (VITAMIN C) 1000 MG tablet, Take 1 tablet by mouth Daily., Disp: , Rfl:   •  aspirin 81 MG EC tablet, Take 1 tablet by mouth Daily., Disp: 30 tablet, Rfl: 11  •  atorvastatin (LIPITOR) 20 MG tablet, Take 1 tablet by mouth Daily., Disp: , Rfl:   •  benzonatate (TESSALON) 200 MG capsule, Take 1 capsule by mouth 3 (Three) Times a Day As Needed for Cough., Disp: , Rfl:   •  Calcium-Phosphorus-Vitamin D (CITRACAL +D3 PO), Take 1 tablet by mouth Daily., Disp: , Rfl:   •  Entresto 49-51 MG tablet, TAKE 1 TABLET BY MOUTH TWICE A DAY, Disp: 180 tablet, Rfl: 1  •   Flaxseed, Linseed, (Flaxseed Oil) 1400 MG capsule, Take 2 tablets by mouth Daily., Disp: , Rfl:   •  loratadine (CLARITIN) 10 MG tablet, Take 1 tablet by mouth Daily As Needed for Allergies., Disp: , Rfl:   •  metoprolol succinate XL (TOPROL-XL) 25 MG 24 hr tablet, TAKE 1 TABLET DAILY, Disp: 90 tablet, Rfl: 1  •  multivitamin with minerals tablet tablet, Take 1 tablet by mouth Daily., Disp: , Rfl:   •  omeprazole (priLOSEC) 40 MG capsule, Take 1 capsule by mouth Daily., Disp: , Rfl:   •  ondansetron (ZOFRAN) 4 MG tablet, Take 1 tablet by mouth Every 6 (Six) Hours As Needed., Disp: , Rfl:   •  spironolactone (ALDACTONE) 25 MG tablet, Take 1 tablet by mouth Daily., Disp: 90 tablet, Rfl: 3  •  triamcinolone (KENALOG) 0.1 % ointment, APPLY TO AFFECTED AREA ON FINGER TWICE A DAY FOR 2 WEEKS. STOP FOR 1 WEEK. THEN REPEAT AS NEEDED, Disp: , Rfl:   •  Turmeric 500 MG capsule, Take 2 capsules by mouth Daily., Disp: , Rfl:   •  spironolactone (ALDACTONE) 25 MG tablet, Take 1 tablet by mouth Daily., Disp: 30 tablet, Rfl: 11    Past Medical History:   Diagnosis Date   • Acid reflux    • COVID-19 12/2022   • Heart murmur    • High cholesterol    • History of stomach ulcers    • Hypertension    • PONV (postoperative nausea and vomiting)    • Sleep apnea     CPAP   • TB lung, latent       Past Surgical History:   Procedure Laterality Date   • AORTIC VALVE REPAIR/REPLACEMENT N/A 3/23/2023    Procedure: TRANSCATHETER AORTIC VALVE REPLACEMENT;  Surgeon: Lauri Kc MD;  Location: [facility];  Service: Cardiothoracic;  Laterality: N/A;  fl  9m30s  dose 340mgy  contrast 80ml   • AORTIC VALVE REPAIR/REPLACEMENT N/A 3/23/2023    Procedure: Transfemoral Transcatheter Aortic Valve Replacement;  Surgeon: Rosa Lutz MD;  Location: [facility];  Service: Cardiovascular;  Laterality: N/A;   • CARDIAC CATHETERIZATION      NO INTERVENTION   • COLONOSCOPY     • KNEE SURGERY Left 1977    REPAIR   • LASIK Bilateral  "2022     Family History   Problem Relation Age of Onset   • Cancer Mother    • Parkinsonism Father    • Heart disease Father    • Heart attack Brother    • No Known Problems Brother    • Obesity Brother      Social History     Socioeconomic History   • Marital status:    • Number of children: 0   Tobacco Use   • Smoking status: Never     Passive exposure: Never   • Smokeless tobacco: Former     Types: Snuff     Quit date: 1998   Vaping Use   • Vaping status: Never Used   Substance and Sexual Activity   • Alcohol use: Yes     Alcohol/week: 2.0 standard drinks of alcohol     Types: 2 Shots of liquor per week     Comment: 7 days a week   • Drug use: Never   • Sexual activity: Defer       Objective     Vital Signs:  /78 (BP Location: Right arm, Patient Position: Sitting, Cuff Size: Adult)   Pulse 76   Ht 172.7 cm (68\")   Wt 82.6 kg (182 lb)   SpO2 99%   BMI 27.67 kg/m²   Estimated body mass index is 27.67 kg/m² as calculated from the following:    Height as of this encounter: 172.7 cm (68\").    Weight as of this encounter: 82.6 kg (182 lb).         Physical Exam  Constitutional:       Appearance: Normal appearance. He is well-developed.   HENT:      Head: Normocephalic and atraumatic.   Eyes:      General: No scleral icterus.     Pupils: Pupils are equal, round, and reactive to light.   Cardiovascular:      Rate and Rhythm: Normal rate and regular rhythm.      Heart sounds: Murmur heard.   Pulmonary:      Breath sounds: Normal breath sounds. No wheezing or rhonchi.   Musculoskeletal:      Right lower leg: No edema.      Left lower leg: No edema.   Skin:     Capillary Refill: Capillary refill takes less than 2 seconds.      Coloration: Skin is not cyanotic.      Nails: There is no clubbing.   Neurological:      Mental Status: He is alert and oriented to person, place, and time.      Motor: No weakness.      Gait: Gait normal.   Psychiatric:         Mood and Affect: Mood normal.         Behavior: " Behavior is cooperative.         Thought Content: Thought content normal.         Cognition and Memory: Memory normal.               ECG 12 Lead    Date/Time: 10/22/2024 12:22 PM  Performed by: Karen Dial MD    Authorized by: Karen Dial MD  Comparison: compared with previous ECG from 5/5/2023  Similar to previous ECG  Rhythm: sinus rhythm  Rate: normal  Conduction: left anterior fascicular block  QRS axis: normal  Other: no other findings  Other findings: left ventricular hypertrophy    Clinical impression: abnormal EKG         Assessment and Plan     Assessment & Plan  Status post transcatheter aortic valve replacement (TAVR) using bioprosthesis  Doing great.  Plan echo at next appointment.  Orders:  •  Adult Transthoracic Echo Complete W/ Cont if Necessary Per Protocol; Future    Hypertension, essential  He is going to monitor his blood pressure off of spironolactone at home.  Told him goal blood pressures less than 130/80.  We are refilling the spironolactone for short period of time because he is going out of the country later this weekend.  That way he can have it if he needs to resume it and take the medication with him.  But if his blood pressure is remaining normal off of the medication then he is going to stop it.    Orders:  •  spironolactone (ALDACTONE) 25 MG tablet; Take 1 tablet by mouth Daily.    Acute systolic congestive heart failure  Previous diagnosis, now resolved.  Recent EF was 50 to 55%.  Doing great.  No need for diuretics.  Slightly peeling back medical therapy now that he is doing so well.       Obstructive sleep apnea syndrome  Download reviewed.  Good compliance and control.  Plan to continue PAP therapy.              Recommendations: Report if any new/changing symptoms immediately      Follow Up  Return in about 6 months (around 4/21/2025) for Next scheduled follow up, with testing at that appointment.    Karen Dial MD   10/21/2024     Please note that this  explicitly excludes time spent on other separate billable services such as performing procedures or test interpretation, when applicable.    This note was created using dictation software which occasionally transcribes nonsensical phrases. Please contact the provider if any clarification is needed.

## 2024-10-22 ENCOUNTER — PATIENT ROUNDING (BHMG ONLY) (OUTPATIENT)
Dept: CARDIOLOGY | Facility: CLINIC | Age: 69
End: 2024-10-22
Payer: MEDICARE

## 2024-10-22 NOTE — ASSESSMENT & PLAN NOTE
Doing great.  Plan echo at next appointment.  Orders:    Adult Transthoracic Echo Complete W/ Cont if Necessary Per Protocol; Future

## 2024-10-22 NOTE — PROGRESS NOTES
..My name is Nan Alva and I am the Practice Manager for [facility].    I would like to thank you for being a loyal patient. If you do not mind I would like to ask you a few questions about your recent visit with us.  Please feel free to reply if you wish to provide us with feedback on your first visit with our practice.    First, could you tell me what went well with your recent visit?    Secondly, we are always looking for ways to make our patients' experiences even better.  Do you have any recommendations on ways we may improve?    Finally, overall were you satisfied with your first visit to us as a [facility] facility?    In the next few days, you will be receiving a Patient Experience Survey.      Thank you for taking the time to answer a few questions today.  I hope you have a good day.

## 2024-10-22 NOTE — ASSESSMENT & PLAN NOTE
He is going to monitor his blood pressure off of spironolactone at home.  Told him goal blood pressures less than 130/80.  We are refilling the spironolactone for short period of time because he is going out of the country later this weekend.  That way he can have it if he needs to resume it and take the medication with him.  But if his blood pressure is remaining normal off of the medication then he is going to stop it.    Orders:    spironolactone (ALDACTONE) 25 MG tablet; Take 1 tablet by mouth Daily.

## 2025-03-13 ENCOUNTER — PATIENT MESSAGE (OUTPATIENT)
Dept: CARDIOLOGY | Facility: CLINIC | Age: 70
End: 2025-03-13
Payer: MEDICARE

## 2025-03-13 DIAGNOSIS — Z95.3 STATUS POST TRANSCATHETER AORTIC VALVE REPLACEMENT (TAVR) USING BIOPROSTHESIS: Primary | ICD-10-CM

## 2025-03-13 RX ORDER — AMOXICILLIN 500 MG/1
2000 CAPSULE ORAL SEE ADMIN INSTRUCTIONS
Qty: 12 CAPSULE | Refills: 11 | Status: SHIPPED | OUTPATIENT
Start: 2025-03-13

## 2025-03-19 ENCOUNTER — TELEPHONE (OUTPATIENT)
Dept: CARDIOLOGY | Facility: CLINIC | Age: 70
End: 2025-03-19
Payer: MEDICARE

## 2025-03-24 ENCOUNTER — TELEPHONE (OUTPATIENT)
Dept: CARDIOLOGY | Facility: CLINIC | Age: 70
End: 2025-03-24

## 2025-03-24 NOTE — TELEPHONE ENCOUNTER
Actually I had written him a MyChart message saying to try to stay on aspirin.  If the dentist says he needs to come off then it will be okay to come off for 5 to 7 days but I prefer to not stop it.  Also in the telephone message dated 3/19 Magda confirmed calling his dentist office with the information about antibiotics and aspirin.

## 2025-03-24 NOTE — TELEPHONE ENCOUNTER
.Any new symptoms since last OV such as chest pain SOA? denies  Any worsening of edema or worsening palpitations? denies  Any major medical issues since last OV we need to know? Needing tooth extraction tomorrow  3/25/25 @ [facility] @ 11:00 AM  Is the patient on Eliquis, xarelto, pradaxa? denies  Is the patient on aspirin? Aspirin 81mg daily  Is the patient on brilinta (ticagrelor), plavix (clopidogrel), prasugrel (effient)? denies  Is the patient on medications like mounjaro or ozempic? denies  Last EKG? 10/22/24  Last stress test? 11/11/2020  Last echo? 4/30/24  Last heart cath or CCTA? 6/1.25 with Echo.    Pt wants to know if he needs to stay on Aspirin.

## 2025-03-24 NOTE — TELEPHONE ENCOUNTER
Caller: Jesus Alberto Sue    Relationship: Self    Best call back number: [phone]    What is the best time to reach you: ANY    Who are you requesting to speak with (clinical staff, provider,  specific staff member): CLINICAL       What was the call regarding: PATIENT WANTS TO MAKE SURE THAT THE DENTIST OFFICE IS GETTING THE DOCUMENTATION FROM DR. BECKWITH. AND WANTS CLARITY ON WHAT THEY CAN TALK PLEASE ADVISE.     Is it okay if the provider responds through MyChart: YES

## 2025-03-24 NOTE — TELEPHONE ENCOUNTER
Hub staff attempted to follow warm transfer process and was unsuccessful     Caller: Jesus Alberto Sue    Relationship to patient: Self    Best call back number: [phone]    Patient is needing: PATIENT RETURNED THE CALL; WT TO THE OFFICE

## 2025-04-21 ENCOUNTER — OFFICE VISIT (OUTPATIENT)
Dept: CARDIOLOGY | Facility: CLINIC | Age: 70
End: 2025-04-21
Payer: MEDICARE

## 2025-04-21 VITALS
WEIGHT: 177 LBS | SYSTOLIC BLOOD PRESSURE: 128 MMHG | DIASTOLIC BLOOD PRESSURE: 62 MMHG | HEART RATE: 52 BPM | BODY MASS INDEX: 26.83 KG/M2 | HEIGHT: 68 IN | OXYGEN SATURATION: 95 %

## 2025-04-21 DIAGNOSIS — Z95.3 STATUS POST TRANSCATHETER AORTIC VALVE REPLACEMENT (TAVR) USING BIOPROSTHESIS: ICD-10-CM

## 2025-04-21 DIAGNOSIS — I50.22 CHRONIC SYSTOLIC CONGESTIVE HEART FAILURE: ICD-10-CM

## 2025-04-21 DIAGNOSIS — G47.33 OBSTRUCTIVE SLEEP APNEA SYNDROME: Primary | ICD-10-CM

## 2025-04-21 PROCEDURE — 3074F SYST BP LT 130 MM HG: CPT | Performed by: INTERNAL MEDICINE

## 2025-04-21 PROCEDURE — G2211 COMPLEX E/M VISIT ADD ON: HCPCS | Performed by: INTERNAL MEDICINE

## 2025-04-21 PROCEDURE — 99214 OFFICE O/P EST MOD 30 MIN: CPT | Performed by: INTERNAL MEDICINE

## 2025-04-21 PROCEDURE — 3078F DIAST BP <80 MM HG: CPT | Performed by: INTERNAL MEDICINE

## 2025-04-21 NOTE — PROGRESS NOTES
Cardiovascular [facility] Sleep [facility]ing Provider Note     Date:   2025   Name: Jesus Alberto Sue  :   1955  PCP: Sameer Desai MD    Chief Complaint   Patient presents with    Results     Pt states he is here today to discuss the results of echo that he had in the office today.     S/P TAVR     Pt states he is here for follow up S/P TAVR. No chest pain or SOA.     Cough     Pt states he has been coughing since placed on Losartan and then Entresto.     Sleep Apnea     Pt states he is here today for follow up KYLEIGH. He states he is doing well on current therapy. DL is in Epic.        Subjective     History of Present Illness    Jesus Alberto Sue is a 69 year old male who presents in clinic today for six month follow up AVR with Echo results.        In this setting, pt denies chest pain, SOA, dizziness or near syncopal episodes.  Denies any falls.  No edema.    Cardiomyopathy and sleep related problem with  1.  Aortic stenosis S/P TAVR 3/2023 Sapient 3 #29 valve placement.  2.  CAD-mild with cath 2021  3.  KYLEIGH - Baseline AHI of 23 on 2022.  4.  Hyperlipidemia  5.  Hypertension  6.  Latent TB  7.  CHF w reduced EF - Post TAVR echo 2023 EF 35-40% - improved to 45-50% 23 on medical therapy.    No Known Allergies    Current Outpatient Medications:     ascorbic acid (VITAMIN C) 1000 MG tablet, Take 1 tablet by mouth Daily., Disp: , Rfl:     aspirin 81 MG EC tablet, Take 1 tablet by mouth Daily., Disp: 30 tablet, Rfl: 11    atorvastatin (LIPITOR) 20 MG tablet, Take 1 tablet by mouth Daily., Disp: , Rfl:     benzonatate (TESSALON) 200 MG capsule, Take 1 capsule by mouth 3 (Three) Times a Day As Needed for Cough., Disp: , Rfl:     Calcium-Phosphorus-Vitamin D (CITRACAL +D3 PO), Take 1 tablet by mouth Daily., Disp: , Rfl:     Entresto 49-51 MG tablet, TAKE 1 TABLET BY MOUTH TWICE A DAY, Disp: 180 tablet, Rfl: 1    Flaxseed, Linseed, (Flaxseed Oil) 1400 MG capsule, Take 2 tablets by mouth Daily.,  Disp: , Rfl:     loratadine (CLARITIN) 10 MG tablet, Take 1 tablet by mouth Daily As Needed for Allergies., Disp: , Rfl:     metoprolol succinate XL (TOPROL-XL) 25 MG 24 hr tablet, TAKE 1 TABLET DAILY, Disp: 90 tablet, Rfl: 1    multivitamin with minerals tablet tablet, Take 1 tablet by mouth Daily., Disp: , Rfl:     omeprazole (priLOSEC) 40 MG capsule, Take 1 capsule by mouth Daily., Disp: , Rfl:     ondansetron (ZOFRAN) 4 MG tablet, Take 1 tablet by mouth Every 6 (Six) Hours As Needed., Disp: , Rfl:     spironolactone (ALDACTONE) 25 MG tablet, Take 1 tablet by mouth Daily., Disp: 30 tablet, Rfl: 11    triamcinolone (KENALOG) 0.1 % ointment, APPLY TO AFFECTED AREA ON FINGER TWICE A DAY FOR 2 WEEKS. STOP FOR 1 WEEK. THEN REPEAT AS NEEDED, Disp: , Rfl:     Turmeric 500 MG capsule, Take 2 capsules by mouth Daily., Disp: , Rfl:     Past Medical History:   Diagnosis Date    Acid reflux     COVID-19 12/2022    Heart murmur     High cholesterol     History of stomach ulcers     Hypertension     PONV (postoperative nausea and vomiting)     Sleep apnea     CPAP    TB lung, latent       Past Surgical History:   Procedure Laterality Date    AORTIC VALVE REPAIR/REPLACEMENT N/A 3/23/2023    Procedure: TRANSCATHETER AORTIC VALVE REPLACEMENT;  Surgeon: Lauri Kc MD;  Location:  [facility];  Service: Cardiothoracic;  Laterality: N/A;  fl  9m30s  dose 340mgy  contrast 80ml    AORTIC VALVE REPAIR/REPLACEMENT N/A 3/23/2023    Procedure: Transfemoral Transcatheter Aortic Valve Replacement;  Surgeon: Rosa Lutz MD;  Location:  [facility];  Service: Cardiovascular;  Laterality: N/A;    CARDIAC CATHETERIZATION      NO INTERVENTION    COLONOSCOPY      KNEE SURGERY Left 1977    REPAIR    LASIK Bilateral 2022     Family History   Problem Relation Age of Onset    Cancer Mother     Parkinsonism Father     Heart disease Father     Heart attack Brother     No Known Problems Brother     Obesity Brother   "    Social History     Socioeconomic History    Marital status:     Number of children: 0   Tobacco Use    Smoking status: Never     Passive exposure: Never    Smokeless tobacco: Former     Types: Snuff     Quit date: 1998   Vaping Use    Vaping status: Never Used   Substance and Sexual Activity    Alcohol use: Yes     Alcohol/week: 2.0 standard drinks of alcohol     Types: 2 Shots of liquor per week     Comment: 7 days a week    Drug use: Never    Sexual activity: Defer       Objective     Vital Signs:  /62 (BP Location: Right arm, Patient Position: Sitting, Cuff Size: Adult)   Pulse 52   Ht 172.7 cm (68\")   Wt 80.3 kg (177 lb)   SpO2 95%   BMI 26.91 kg/m²   Estimated body mass index is 26.91 kg/m² as calculated from the following:    Height as of this encounter: 172.7 cm (68\").    Weight as of this encounter: 80.3 kg (177 lb).         Physical Exam  Constitutional:       Appearance: Normal appearance. He is well-developed.   HENT:      Head: Normocephalic and atraumatic.   Eyes:      General: No scleral icterus.     Pupils: Pupils are equal, round, and reactive to light.   Cardiovascular:      Rate and Rhythm: Normal rate and regular rhythm.      Heart sounds: Normal heart sounds. No murmur heard.  Pulmonary:      Breath sounds: Normal breath sounds. No wheezing or rhonchi.   Musculoskeletal:      Right lower leg: No edema.      Left lower leg: No edema.   Skin:     Capillary Refill: Capillary refill takes less than 2 seconds.      Coloration: Skin is not cyanotic.      Nails: There is no clubbing.   Neurological:      Mental Status: He is alert and oriented to person, place, and time.      Motor: No weakness.      Gait: Gait normal.   Psychiatric:         Mood and Affect: Mood normal.         Behavior: Behavior is cooperative.         Thought Content: Thought content normal.                     Assessment and Plan     ASSESSMENTS AND ORDERS  Diagnoses and all orders for this visit:    1. " Obstructive sleep apnea syndrome (Primary)  -     Titration; Future    2. Chronic systolic congestive heart failure    3. Status post transcatheter aortic valve replacement (TAVR) using bioprosthesis             PLAN  -coughing with entresto.  Euvolemic and doing well otherwise from a CHF standpoint.  He is going out of the country but will follow up when he gets back and we will change then and work on something else for BP control, his EF has subsequently improved so he may stop Entresto and go to something else.  I think his EF was low because of his heart valve initially.  -Htn looks good now on home BP log  -TAVR with a lot of calcium on echo, moderate gradients, will monitor, no symptoms.   -elevated AHI on CPPA dl will get a titration sleep study this summer            Follow Up  Return in about 6 weeks (around 6/2/2025) for Recheck symptoms, Recheck blood pressure.    HARRY Dial MD  Cardiology and Sleep Psychiatric  04/21/2025     Please note that this explicitly excludes time spent on other separate billable services such as performing procedures or test interpretation, when applicable.    This note was created using dictation software which occasionally transcribes nonsensical phrases. Please contact the provider if any clarification is needed.

## 2025-04-28 DIAGNOSIS — I50.21 ACUTE SYSTOLIC CONGESTIVE HEART FAILURE: ICD-10-CM

## 2025-04-28 RX ORDER — METOPROLOL SUCCINATE 25 MG/1
25 TABLET, EXTENDED RELEASE ORAL DAILY
Qty: 90 TABLET | Refills: 1 | Status: SHIPPED | OUTPATIENT
Start: 2025-04-28

## 2025-04-28 RX ORDER — ATORVASTATIN CALCIUM 20 MG/1
20 TABLET, FILM COATED ORAL DAILY
Qty: 90 TABLET | Refills: 0 | Status: SHIPPED | OUTPATIENT
Start: 2025-04-28

## 2025-06-02 ENCOUNTER — OFFICE VISIT (OUTPATIENT)
Dept: CARDIOLOGY | Facility: CLINIC | Age: 70
End: 2025-06-02
Payer: MEDICARE

## 2025-06-02 VITALS
OXYGEN SATURATION: 99 % | HEART RATE: 85 BPM | WEIGHT: 173 LBS | BODY MASS INDEX: 26.22 KG/M2 | DIASTOLIC BLOOD PRESSURE: 70 MMHG | SYSTOLIC BLOOD PRESSURE: 110 MMHG | HEIGHT: 68 IN

## 2025-06-02 DIAGNOSIS — I10 HYPERTENSION, ESSENTIAL: ICD-10-CM

## 2025-06-02 DIAGNOSIS — I50.21 ACUTE SYSTOLIC CONGESTIVE HEART FAILURE: Primary | ICD-10-CM

## 2025-06-02 DIAGNOSIS — G47.33 OBSTRUCTIVE SLEEP APNEA SYNDROME: ICD-10-CM

## 2025-06-02 PROCEDURE — 99214 OFFICE O/P EST MOD 30 MIN: CPT | Performed by: INTERNAL MEDICINE

## 2025-06-02 PROCEDURE — 3074F SYST BP LT 130 MM HG: CPT | Performed by: INTERNAL MEDICINE

## 2025-06-02 PROCEDURE — 3078F DIAST BP <80 MM HG: CPT | Performed by: INTERNAL MEDICINE

## 2025-06-02 RX ORDER — LOSARTAN POTASSIUM 50 MG/1
50 TABLET ORAL DAILY
Qty: 30 TABLET | Refills: 5 | Status: SHIPPED | OUTPATIENT
Start: 2025-06-02

## 2025-06-02 NOTE — PROGRESS NOTES
[facility] Provider Note     Date:   2025   Name: Jesus Alberto Sue  :   1955  PCP: Sameer Desai MD    Chief Complaint   Patient presents with   • Sleep Apnea     Pt states he is here today for follow up KYLEIGH. He is doing well on current therapy. DL is in Epic.   • Congestive Heart Failure     Pt states he is here today for follow up CHF. No chest pain or SOA.        Subjective     History of Present Illness    Jesus Alberto Sue is a 69 year old male who presents in clinic today for follow up of KYLEIGH, CAD,HTN, CHF, S/P TAVR   Here to discuss a sleep test this summer to see about making changes to machine. Has reported no issues with C-Pap.Has just gotten in from travels and feels as though he is finally getting back in to the swing of things. DL in EPIC.  Reports that B/P has been doing well.  Has no edema, no shortness of breath. Denies chest pain or palpitations.  No new issues or concerns.  Wanting to see if RSV vaccine would be good for him and if recommended  Still coughing and wondering if can change off the Entresto yet.  2025 Updated.    Cardiomyopathy and sleep related problem with  1.  Aortic stenosis S/P TAVR 3/2023 Sapient 3 #29 valve placement.  2.  CAD-mild with cath 2021  3.  KYLEIGH - Baseline AHI of 23 on 2022.  4.  Hyperlipidemia  5.  Hypertension  6.  Latent TB  7.  CHF w reduced EF - Post TAVR echo 2023 EF 35-40% - improved to 45-50% 23 on medical therapy.    No Known Allergies    Current Outpatient Medications:   •  ascorbic acid (VITAMIN C) 1000 MG tablet, Take 1 tablet by mouth Daily., Disp: , Rfl:   •  aspirin 81 MG EC tablet, Take 1 tablet by mouth Daily., Disp: 30 tablet, Rfl: 11  •  atorvastatin (LIPITOR) 20 MG tablet, Take 1 tablet by mouth Daily., Disp: 90 tablet, Rfl: 0  •  benzonatate (TESSALON) 200 MG capsule, Take 1 capsule by mouth 3 (Three) Times a Day As Needed for Cough., Disp: , Rfl:   •  Calcium-Phosphorus-Vitamin D  (CITRACAL +D3 PO), Take 1 tablet by mouth Daily., Disp: , Rfl:   •  Flaxseed, Linseed, (Flaxseed Oil) 1400 MG capsule, Take 2 tablets by mouth Daily., Disp: , Rfl:   •  loratadine (CLARITIN) 10 MG tablet, Take 1 tablet by mouth Daily As Needed for Allergies., Disp: , Rfl:   •  metoprolol succinate XL (TOPROL-XL) 25 MG 24 hr tablet, Take 1 tablet by mouth Daily., Disp: 90 tablet, Rfl: 1  •  multivitamin with minerals tablet tablet, Take 1 tablet by mouth Daily., Disp: , Rfl:   •  omeprazole (priLOSEC) 40 MG capsule, Take 1 capsule by mouth Daily., Disp: , Rfl:   •  ondansetron (ZOFRAN) 4 MG tablet, Take 1 tablet by mouth Every 6 (Six) Hours As Needed., Disp: , Rfl:   •  spironolactone (ALDACTONE) 25 MG tablet, Take 1 tablet by mouth Daily., Disp: 30 tablet, Rfl: 11  •  triamcinolone (KENALOG) 0.1 % ointment, APPLY TO AFFECTED AREA ON FINGER TWICE A DAY FOR 2 WEEKS. STOP FOR 1 WEEK. THEN REPEAT AS NEEDED, Disp: , Rfl:   •  Turmeric 500 MG capsule, Take 2 capsules by mouth Daily., Disp: , Rfl:   •  losartan (Cozaar) 50 MG tablet, Take 1 tablet by mouth Daily., Disp: 30 tablet, Rfl: 5    Past Medical History:   Diagnosis Date   • Acid reflux    • COVID-19 12/2022   • Heart murmur    • High cholesterol    • History of stomach ulcers    • Hypertension    • PONV (postoperative nausea and vomiting)    • Sleep apnea     CPAP   • TB lung, latent       Past Surgical History:   Procedure Laterality Date   • AORTIC VALVE REPAIR/REPLACEMENT N/A 3/23/2023    Procedure: TRANSCATHETER AORTIC VALVE REPLACEMENT;  Surgeon: Lauri Kc MD;  Location: [facility];  Service: Cardiothoracic;  Laterality: N/A;  fl  9m30s  dose 340mgy  contrast 80ml   • AORTIC VALVE REPAIR/REPLACEMENT N/A 3/23/2023    Procedure: Transfemoral Transcatheter Aortic Valve Replacement;  Surgeon: Rosa Lutz MD;  Location: [facility];  Service: Cardiovascular;  Laterality: N/A;   • CARDIAC CATHETERIZATION      NO INTERVENTION   •  "COLONOSCOPY     • KNEE SURGERY Left 1977    REPAIR   • LASIK Bilateral 2022     Family History   Problem Relation Age of Onset   • Cancer Mother    • Parkinsonism Father    • Heart disease Father    • Heart attack Brother    • No Known Problems Brother    • Obesity Brother      Social History     Socioeconomic History   • Marital status:    • Number of children: 0   Tobacco Use   • Smoking status: Never     Passive exposure: Never   • Smokeless tobacco: Former     Types: Snuff     Quit date: 1998   Vaping Use   • Vaping status: Never Used   Substance and Sexual Activity   • Alcohol use: Yes     Alcohol/week: 2.0 standard drinks of alcohol     Types: 2 Shots of liquor per week     Comment: 7 days a week   • Drug use: Never   • Sexual activity: Defer       Objective     Vital Signs:  /70 (BP Location: Right arm, Patient Position: Sitting, Cuff Size: Adult)   Pulse 85   Ht 172.7 cm (68\")   Wt 78.5 kg (173 lb)   SpO2 99%   BMI 26.30 kg/m²   Estimated body mass index is 26.3 kg/m² as calculated from the following:    Height as of this encounter: 172.7 cm (68\").    Weight as of this encounter: 78.5 kg (173 lb).         Physical Exam  Constitutional:       Appearance: Normal appearance. He is well-developed.   HENT:      Head: Normocephalic and atraumatic.   Eyes:      General: No scleral icterus.     Pupils: Pupils are equal, round, and reactive to light.   Cardiovascular:      Rate and Rhythm: Normal rate and regular rhythm.      Heart sounds: Normal heart sounds. No murmur heard.  Pulmonary:      Breath sounds: Normal breath sounds. No wheezing or rhonchi.   Musculoskeletal:      Right lower leg: No edema.      Left lower leg: No edema.   Skin:     Capillary Refill: Capillary refill takes less than 2 seconds.      Coloration: Skin is not cyanotic.      Nails: There is no clubbing.   Neurological:      Mental Status: He is alert and oriented to person, place, and time.      Motor: No weakness.      " Gait: Gait normal.   Psychiatric:         Mood and Affect: Mood normal.         Behavior: Behavior is cooperative.         Thought Content: Thought content normal.                     Assessment and Plan     ASSESSMENTS AND ORDERS  Diagnoses and all orders for this visit:    1. Acute systolic congestive heart failure (Primary)  -     losartan (Cozaar) 50 MG tablet; Take 1 tablet by mouth Daily.  Dispense: 30 tablet; Refill: 5    2. Obstructive sleep apnea syndrome  -     Titration; Future    3. Hypertension, essential               PLAN  -coughing with entresto.  Euvolemic and doing well otherwise from a CHF standpoint.  Changing entresto out to losartan, will send losartan to [facility] if this helps but fill locally for now.   -Htn looks good now on home BP log  -TAVR with a lot of calcium on echo, moderate gradients, will monitor, no symptoms.   -elevated AHI on CPAP dl will get a titration sleep study             Follow Up  Return in about 2 months (around 8/2/2025) for results of testing, Recheck blood pressure.    HARRY Dial MD  [facility]iology [facility]  06/02/2025     Please note that this explicitly excludes time spent on other separate billable services such as performing procedures or test interpretation, when applicable.    This note was created using dictation software which occasionally transcribes nonsensical phrases. Please contact the provider if any clarification is needed.

## 2025-07-14 DIAGNOSIS — G47.33 OBSTRUCTIVE SLEEP APNEA SYNDROME: ICD-10-CM

## 2025-07-24 ENCOUNTER — TELEPHONE (OUTPATIENT)
Dept: CARDIOLOGY | Facility: CLINIC | Age: 70
End: 2025-07-24
Payer: MEDICARE

## 2025-07-24 RX ORDER — ATORVASTATIN CALCIUM 20 MG/1
20 TABLET, FILM COATED ORAL DAILY
Qty: 90 TABLET | Refills: 0 | Status: SHIPPED | OUTPATIENT
Start: 2025-07-24

## 2025-07-24 NOTE — TELEPHONE ENCOUNTER
Cassandra @ [facility] called and needs an addendum on your last note stating that he has tried and failed C-PAP and why he needs the Bipap.

## (undated) DEVICE — SUCTION CANISTER 2500CC: Brand: DEROYAL

## (undated) DEVICE — APPL CHLORAPREP TINTED 26ML TEAL

## (undated) DEVICE — GOWN,NON-REINFORCED,SIRUS,SET IN SLV,XL: Brand: MEDLINE

## (undated) DEVICE — HDRST POSTIN FM CRDL TRACH SLOT 9X8X4IN

## (undated) DEVICE — STERILE PVP: Brand: MEDLINE INDUSTRIES, INC.

## (undated) DEVICE — DRAPE,TOP,102X53,STERILE: Brand: MEDLINE

## (undated) DEVICE — ADHS SKIN PREMIERPRO EXOFIN TOPICAL HI/VISC .5ML

## (undated) DEVICE — PERCLOSE™ PROSTYLE™ SUTURE-MEDIATED CLOSURE AND REPAIR SYSTEM: Brand: PERCLOSE™ PROSTYLE™

## (undated) DEVICE — BLANKT WARM UNDER/BDY A/ 100X195CM DISP LF

## (undated) DEVICE — GLV SURG BIOGEL LTX PF 7 1/2

## (undated) DEVICE — CATH GUIDE BERN 5F 65CM

## (undated) DEVICE — BOWL UTIL STRL 32OZ

## (undated) DEVICE — INTENDED FOR TISSUE SEPARATION, AND OTHER PROCEDURES THAT REQUIRE A SHARP SURGICAL BLADE TO PUNCTURE OR CUT.: Brand: BARD-PARKER ® STAINLESS STEEL BLADES

## (undated) DEVICE — COVER,TABLE,HVY DUTY,60"X90",STRL: Brand: MEDLINE

## (undated) DEVICE — SUT SILK 0 SH 30IN K834H

## (undated) DEVICE — 3M™ IOBAN™ 2 ANTIMICROBIAL INCISE DRAPE 6651EZ: Brand: IOBAN™ 2

## (undated) DEVICE — ELECTRD NDL EZ CLN STD 2.75IN

## (undated) DEVICE — NDL PERC 1PRT THNWALL W/BASEPLT 18G 7CM

## (undated) DEVICE — BOOT SUT XRAY DETECT STD YEL/BLU CA/50

## (undated) DEVICE — 3M™ STERI-DRAPE™ INSTRUMENT POUCH 1018: Brand: STERI-DRAPE™

## (undated) DEVICE — GW SAFARI2 PRESH XSM CRV .035IN 3.2X2.9X275CM

## (undated) DEVICE — SI AVANTI+ 7F STD W/GW  NO OBT: Brand: AVANTI

## (undated) DEVICE — SENSR CERBRL O2 PK/2

## (undated) DEVICE — CATH DIAG EXPO .056 AL1 6F 100CM

## (undated) DEVICE — SLV REPOSTNG CATH STRL 60CM

## (undated) DEVICE — PK ATS CUST W CARDIOTOMY RESEVOIR

## (undated) DEVICE — SHEET, DRAPE, SPLIT, STERILE: Brand: MEDLINE

## (undated) DEVICE — ELECTRD DEFIB M/FUNC PROPADZ STRL 2PK

## (undated) DEVICE — CABL PACE ATRIAL PT BLU

## (undated) DEVICE — PK MINOR SPLT 10

## (undated) DEVICE — GW CERBRL FC PTFE STD/STR .035 260CM

## (undated) DEVICE — DECANTER BAG 9": Brand: MEDLINE INDUSTRIES, INC.

## (undated) DEVICE — GLV SURG BIOGEL LTX PF 8

## (undated) DEVICE — PATIENT RETURN ELECTRODE, SINGLE-USE, CONTACT QUALITY MONITORING, ADULT, WITH 9FT CORD, FOR PATIENTS WEIGING OVER 33LBS. (15KG): Brand: MEGADYNE

## (undated) DEVICE — GW AMPLTZ SUPERSTIFF STR .035IN 180CM

## (undated) DEVICE — PINNACLE INTRODUCER SHEATH: Brand: PINNACLE

## (undated) DEVICE — PENCL ROCKRSWCH MEGADYNE W/HOLSTR 10FT SS

## (undated) DEVICE — COVER,MAYO STAND,XL,STERILE: Brand: MEDLINE

## (undated) DEVICE — SYR LUERLOK 50ML

## (undated) DEVICE — TBG PENCL TELESCP MEGADYNE SMOKE EVAC 10FT

## (undated) DEVICE — TRAP FLD MINIVAC MEGADYNE 100ML

## (undated) DEVICE — SYR LUERLOK 30CC

## (undated) DEVICE — GW PERIPH VASC ADX J/TP SS .035 150CM 3MM

## (undated) DEVICE — ANGIOGRAPHIC CATHETER: Brand: EXPO™

## (undated) DEVICE — CATH PACE PACEL BIPOL 5F110CM

## (undated) DEVICE — RADIFOCUS GLIDEWIRE: Brand: GLIDEWIRE

## (undated) DEVICE — KT MANIFOLD CATHLAB CUST

## (undated) DEVICE — PROVIDES A STERILE INTERFACE BETWEEN THE OPERATING ROOM SURGICAL LAMPS (NON-STERILE) AND THE SURGEON OR NURSE (STERILE).: Brand: STERION®CLAMP COVER FABRIC

## (undated) DEVICE — HNDL BLAD BARD/PARKER POLY REUS

## (undated) DEVICE — DRSNG SURESITE WNDW 4X4.5

## (undated) DEVICE — 3M™ STERI-DRAPE™ FLUOROSCOPE DRAPE, 10 PER CARTON / 4 CARTONS PER CASE, 1012: Brand: STERI-DRAPE™

## (undated) DEVICE — PK CATH CARD 10

## (undated) DEVICE — GLIDEX™ COATED HYDROPHILIC GUIDEWIRE: Brand: MAGIC TORQUE™